# Patient Record
Sex: FEMALE | Race: WHITE | Employment: FULL TIME | URBAN - METROPOLITAN AREA
[De-identification: names, ages, dates, MRNs, and addresses within clinical notes are randomized per-mention and may not be internally consistent; named-entity substitution may affect disease eponyms.]

---

## 2024-04-17 ENCOUNTER — HOSPITAL ENCOUNTER (INPATIENT)
Age: 59
LOS: 8 days | Discharge: HOME | DRG: 419 | End: 2024-04-25
Attending: FAMILY MEDICINE | Admitting: FAMILY MEDICINE
Payer: COMMERCIAL

## 2024-04-17 DIAGNOSIS — R79.89 ELEVATED LFTS: ICD-10-CM

## 2024-04-17 DIAGNOSIS — G89.18 POSTOPERATIVE PAIN: ICD-10-CM

## 2024-04-17 DIAGNOSIS — K80.50 BILIARY COLIC: Primary | ICD-10-CM

## 2024-04-17 DIAGNOSIS — R10.9 ABDOMINAL PAIN, UNSPECIFIED ABDOMINAL LOCATION: ICD-10-CM

## 2024-04-17 DIAGNOSIS — K81.0 ACUTE CHOLECYSTITIS: ICD-10-CM

## 2024-04-17 PROCEDURE — G0378 HOSPITAL OBSERVATION PER HR: HCPCS

## 2024-04-17 PROCEDURE — 96374 THER/PROPH/DIAG INJ IV PUSH: CPT

## 2024-04-17 PROCEDURE — G0379 DIRECT REFER HOSPITAL OBSERV: HCPCS

## 2024-04-17 PROCEDURE — 99222 1ST HOSP IP/OBS MODERATE 55: CPT

## 2024-04-17 PROCEDURE — 1200000000 HC SEMI PRIVATE

## 2024-04-17 PROCEDURE — 96372 THER/PROPH/DIAG INJ SC/IM: CPT

## 2024-04-17 PROCEDURE — 6360000002 HC RX W HCPCS

## 2024-04-17 PROCEDURE — 2580000003 HC RX 258

## 2024-04-17 PROCEDURE — 96361 HYDRATE IV INFUSION ADD-ON: CPT

## 2024-04-17 RX ORDER — POTASSIUM CHLORIDE 20 MEQ/1
40 TABLET, EXTENDED RELEASE ORAL PRN
Status: DISCONTINUED | OUTPATIENT
Start: 2024-04-17 | End: 2024-04-25 | Stop reason: HOSPADM

## 2024-04-17 RX ORDER — SODIUM CHLORIDE 9 MG/ML
INJECTION, SOLUTION INTRAVENOUS PRN
Status: DISCONTINUED | OUTPATIENT
Start: 2024-04-17 | End: 2024-04-25 | Stop reason: HOSPADM

## 2024-04-17 RX ORDER — ACETAMINOPHEN 650 MG/1
650 SUPPOSITORY RECTAL EVERY 6 HOURS PRN
Status: DISCONTINUED | OUTPATIENT
Start: 2024-04-17 | End: 2024-04-18

## 2024-04-17 RX ORDER — MORPHINE SULFATE 2 MG/ML
2 INJECTION, SOLUTION INTRAMUSCULAR; INTRAVENOUS EVERY 4 HOURS PRN
Status: DISCONTINUED | OUTPATIENT
Start: 2024-04-17 | End: 2024-04-17 | Stop reason: SDUPTHER

## 2024-04-17 RX ORDER — ONDANSETRON 4 MG/1
4 TABLET, ORALLY DISINTEGRATING ORAL EVERY 8 HOURS PRN
Status: DISCONTINUED | OUTPATIENT
Start: 2024-04-17 | End: 2024-04-25 | Stop reason: HOSPADM

## 2024-04-17 RX ORDER — MAGNESIUM SULFATE 1 G/100ML
1000 INJECTION INTRAVENOUS PRN
Status: DISCONTINUED | OUTPATIENT
Start: 2024-04-17 | End: 2024-04-25 | Stop reason: HOSPADM

## 2024-04-17 RX ORDER — SODIUM CHLORIDE 0.9 % (FLUSH) 0.9 %
10 SYRINGE (ML) INJECTION PRN
Status: DISCONTINUED | OUTPATIENT
Start: 2024-04-17 | End: 2024-04-25 | Stop reason: HOSPADM

## 2024-04-17 RX ORDER — POLYETHYLENE GLYCOL 3350 17 G/17G
17 POWDER, FOR SOLUTION ORAL DAILY PRN
Status: DISCONTINUED | OUTPATIENT
Start: 2024-04-17 | End: 2024-04-25 | Stop reason: HOSPADM

## 2024-04-17 RX ORDER — ACETAMINOPHEN 325 MG/1
650 TABLET ORAL EVERY 6 HOURS PRN
Status: DISCONTINUED | OUTPATIENT
Start: 2024-04-17 | End: 2024-04-18

## 2024-04-17 RX ORDER — MORPHINE SULFATE 2 MG/ML
2 INJECTION, SOLUTION INTRAMUSCULAR; INTRAVENOUS EVERY 4 HOURS PRN
Status: DISCONTINUED | OUTPATIENT
Start: 2024-04-17 | End: 2024-04-18

## 2024-04-17 RX ORDER — SODIUM CHLORIDE 0.9 % (FLUSH) 0.9 %
5-40 SYRINGE (ML) INJECTION EVERY 12 HOURS SCHEDULED
Status: DISCONTINUED | OUTPATIENT
Start: 2024-04-17 | End: 2024-04-22

## 2024-04-17 RX ORDER — ENOXAPARIN SODIUM 100 MG/ML
40 INJECTION SUBCUTANEOUS DAILY
Status: DISCONTINUED | OUTPATIENT
Start: 2024-04-17 | End: 2024-04-25 | Stop reason: HOSPADM

## 2024-04-17 RX ORDER — SODIUM CHLORIDE, SODIUM LACTATE, POTASSIUM CHLORIDE, CALCIUM CHLORIDE 600; 310; 30; 20 MG/100ML; MG/100ML; MG/100ML; MG/100ML
INJECTION, SOLUTION INTRAVENOUS CONTINUOUS
Status: DISCONTINUED | OUTPATIENT
Start: 2024-04-17 | End: 2024-04-18

## 2024-04-17 RX ORDER — ONDANSETRON 2 MG/ML
4 INJECTION INTRAMUSCULAR; INTRAVENOUS EVERY 6 HOURS PRN
Status: DISCONTINUED | OUTPATIENT
Start: 2024-04-17 | End: 2024-04-25 | Stop reason: HOSPADM

## 2024-04-17 RX ORDER — POTASSIUM CHLORIDE 7.45 MG/ML
10 INJECTION INTRAVENOUS PRN
Status: DISCONTINUED | OUTPATIENT
Start: 2024-04-17 | End: 2024-04-25 | Stop reason: HOSPADM

## 2024-04-17 RX ADMIN — SODIUM CHLORIDE, POTASSIUM CHLORIDE, SODIUM LACTATE AND CALCIUM CHLORIDE: 600; 310; 30; 20 INJECTION, SOLUTION INTRAVENOUS at 18:42

## 2024-04-17 RX ADMIN — ENOXAPARIN SODIUM 40 MG: 100 INJECTION SUBCUTANEOUS at 21:33

## 2024-04-17 RX ADMIN — PIPERACILLIN AND TAZOBACTAM 3375 MG: 3; .375 INJECTION, POWDER, LYOPHILIZED, FOR SOLUTION INTRAVENOUS at 21:33

## 2024-04-17 RX ADMIN — MORPHINE SULFATE 2 MG: 2 INJECTION, SOLUTION INTRAMUSCULAR; INTRAVENOUS at 19:15

## 2024-04-17 ASSESSMENT — PAIN DESCRIPTION - DESCRIPTORS
DESCRIPTORS: BURNING
DESCRIPTORS: BURNING

## 2024-04-17 ASSESSMENT — PAIN DESCRIPTION - LOCATION
LOCATION: ABDOMEN
LOCATION: ABDOMEN

## 2024-04-17 ASSESSMENT — PAIN SCALES - GENERAL
PAINLEVEL_OUTOF10: 7
PAINLEVEL_OUTOF10: 2
PAINLEVEL_OUTOF10: 7

## 2024-04-17 ASSESSMENT — PAIN DESCRIPTION - ORIENTATION
ORIENTATION: UPPER
ORIENTATION: UPPER

## 2024-04-17 NOTE — H&P
[facility]  Office: [phone]  Derrick Richards DO, Pedro Guajardo DO, Pasquale Ribera DO, Tuan Gómez DO, Aarti Bang MD, Bree Gonzalez MD, Bhupendra Up MD, Fabi Gaming MD,  Mulugeta Harden MD, Mary Kate Quijano MD, Joana Mckeon MD,  Chelle Pascual DO, Izabela Roldan MD, Jerry Ramires MD, Dontrell Richards DO, Marilee Ramsay MD,  Lex Shah DO, Kavitha Newell MD, Katja Alvarado MD, Citlaly Dye MD, Leo Ye MD,  Norris Paige MD, Yobani Joaquin MD, Tessy Green MD, Janey Radford MD, Efrain Tuttle MD, Divya Suh MD, Sonny Stewart DO, Js Steen DO, Bart Gay MD,  Lonny Rincon MD, Shirley Waterhouse, CNP,  Geetha Carrillo CNP, Sen Blanco, CNP,  Deneen Aaron, DNP, Edwige Cota, CNP, Emilie Muniz, CNP, Tania Aly CNP, Dariela Delcid, CNP, Wen Schuster, CNP, Lety Kay, PA-C, Franchesca Lynn PA-C, Marlen Anderson, CNP, Mary Trujillo, CNP, Mechelle Centeno, CNP, Talia Carrion, CNS, Farhana Mathews, CNP, Alma Hansen, CNP, Tracy Schwab, CNP         [facility]   IN-PATIENT SERVICE   [facility]    HISTORY AND PHYSICAL EXAMINATION            Date:   4/17/2024  Patient name:  Mary Gamboa  Date of admission:  4/17/2024  6:15 PM  MRN:   [id number]  Account:  [id number]  YOB: 1965  PCP:    No primary care provider on file.  Room:   2005/2005-01  Code Status:    Full Code    Chief Complaint:     No chief complaint on file.      History Obtained From:     patient    History of Present Illness:     Mary Gamboa is a 59 y.o. Non- / non  female who presents with No chief complaint on file.   and is admitted to the hospital for the management of Acute cholecystitis.    Patient transferred from [facility] where she initially presented on 4/17/24 with complaints of substernal and epigastric pain x 3 days with associated nausea, chills and intermittent shortness of breath. She denies any

## 2024-04-18 ENCOUNTER — APPOINTMENT (OUTPATIENT)
Dept: MRI IMAGING | Age: 59
DRG: 419 | End: 2024-04-18
Attending: FAMILY MEDICINE
Payer: COMMERCIAL

## 2024-04-18 LAB
ALBUMIN SERPL-MCNC: 3.6 G/DL (ref 3.5–5.2)
ALP SERPL-CCNC: 187 U/L (ref 35–104)
ALT SERPL-CCNC: 230 U/L (ref 5–33)
ANION GAP SERPL CALCULATED.3IONS-SCNC: 13 MMOL/L (ref 9–17)
AST SERPL-CCNC: 202 U/L
BASOPHILS # BLD: 0 K/UL (ref 0–0.2)
BASOPHILS NFR BLD: 0 % (ref 0–2)
BILIRUB DIRECT SERPL-MCNC: 0.1 MG/DL
BILIRUB INDIRECT SERPL-MCNC: 0.3 MG/DL (ref 0–1)
BILIRUB SERPL-MCNC: 0.4 MG/DL (ref 0.3–1.2)
BUN SERPL-MCNC: 18 MG/DL (ref 6–20)
BUN/CREAT SERPL: 36 (ref 9–20)
CALCIUM SERPL-MCNC: 8.5 MG/DL (ref 8.6–10.4)
CHLORIDE SERPL-SCNC: 105 MMOL/L (ref 98–107)
CO2 SERPL-SCNC: 20 MMOL/L (ref 20–31)
CREAT SERPL-MCNC: 0.5 MG/DL (ref 0.5–0.9)
EOSINOPHIL # BLD: 0 K/UL (ref 0–0.44)
EOSINOPHILS RELATIVE PERCENT: 0 % (ref 1–4)
ERYTHROCYTE [DISTWIDTH] IN BLOOD BY AUTOMATED COUNT: 13.6 % (ref 11.8–14.4)
GFR SERPL CREATININE-BSD FRML MDRD: >90 ML/MIN/1.73M2
GLUCOSE BLD-MCNC: 140 MG/DL (ref 65–105)
GLUCOSE BLD-MCNC: 85 MG/DL (ref 65–105)
GLUCOSE BLD-MCNC: 90 MG/DL (ref 65–105)
GLUCOSE SERPL-MCNC: 92 MG/DL (ref 70–99)
HAV IGM SERPL QL IA: NONREACTIVE
HBV CORE IGM SERPL QL IA: NONREACTIVE
HBV SURFACE AG SERPL QL IA: NONREACTIVE
HCT VFR BLD AUTO: 35.4 % (ref 36.3–47.1)
HCV AB SERPL QL IA: NONREACTIVE
HGB BLD-MCNC: 11 G/DL (ref 11.9–15.1)
IMM GRANULOCYTES # BLD AUTO: 0.03 K/UL (ref 0–0.3)
IMM GRANULOCYTES NFR BLD: 1 %
INR PPP: 1
LIPASE SERPL-CCNC: 46 U/L (ref 13–60)
LYMPHOCYTES NFR BLD: 0.69 K/UL (ref 1.1–3.7)
LYMPHOCYTES RELATIVE PERCENT: 21 % (ref 24–43)
MCH RBC QN AUTO: 28.8 PG (ref 25.2–33.5)
MCHC RBC AUTO-ENTMCNC: 31.1 G/DL (ref 28.4–34.8)
MCV RBC AUTO: 92.7 FL (ref 82.6–102.9)
MONOCYTES NFR BLD: 0.26 K/UL (ref 0.1–1.2)
MONOCYTES NFR BLD: 8 % (ref 3–12)
NEUTROPHILS NFR BLD: 70 % (ref 36–65)
NEUTS SEG NFR BLD: 2.32 K/UL (ref 1.5–8.1)
NRBC BLD-RTO: 0 PER 100 WBC
PLATELET # BLD AUTO: 143 K/UL (ref 138–453)
PMV BLD AUTO: 10.5 FL (ref 8.1–13.5)
POTASSIUM SERPL-SCNC: 4.2 MMOL/L (ref 3.7–5.3)
PROT SERPL-MCNC: 6.7 G/DL (ref 6.4–8.3)
PROTHROMBIN TIME: 13.5 SEC (ref 11.5–14.2)
RBC # BLD AUTO: 3.82 M/UL (ref 3.95–5.11)
SODIUM SERPL-SCNC: 138 MMOL/L (ref 135–144)
WBC OTHER # BLD: 3.3 K/UL (ref 3.5–11.3)

## 2024-04-18 PROCEDURE — 96361 HYDRATE IV INFUSION ADD-ON: CPT

## 2024-04-18 PROCEDURE — 74183 MRI ABD W/O CNTR FLWD CNTR: CPT

## 2024-04-18 PROCEDURE — 6360000002 HC RX W HCPCS: Performed by: NURSE PRACTITIONER

## 2024-04-18 PROCEDURE — 96375 TX/PRO/DX INJ NEW DRUG ADDON: CPT

## 2024-04-18 PROCEDURE — 80076 HEPATIC FUNCTION PANEL: CPT

## 2024-04-18 PROCEDURE — 36415 COLL VENOUS BLD VENIPUNCTURE: CPT

## 2024-04-18 PROCEDURE — 86225 DNA ANTIBODY NATIVE: CPT

## 2024-04-18 PROCEDURE — 85025 COMPLETE CBC W/AUTO DIFF WBC: CPT

## 2024-04-18 PROCEDURE — G0378 HOSPITAL OBSERVATION PER HR: HCPCS

## 2024-04-18 PROCEDURE — 2580000003 HC RX 258: Performed by: NURSE PRACTITIONER

## 2024-04-18 PROCEDURE — A9579 GAD-BASE MR CONTRAST NOS,1ML: HCPCS | Performed by: PHYSICIAN ASSISTANT

## 2024-04-18 PROCEDURE — 2580000003 HC RX 258

## 2024-04-18 PROCEDURE — 99231 SBSQ HOSP IP/OBS SF/LOW 25: CPT | Performed by: NURSE PRACTITIONER

## 2024-04-18 PROCEDURE — 86038 ANTINUCLEAR ANTIBODIES: CPT

## 2024-04-18 PROCEDURE — 83690 ASSAY OF LIPASE: CPT

## 2024-04-18 PROCEDURE — 82947 ASSAY GLUCOSE BLOOD QUANT: CPT

## 2024-04-18 PROCEDURE — 80074 ACUTE HEPATITIS PANEL: CPT

## 2024-04-18 PROCEDURE — 96376 TX/PRO/DX INJ SAME DRUG ADON: CPT

## 2024-04-18 PROCEDURE — 6360000004 HC RX CONTRAST MEDICATION: Performed by: PHYSICIAN ASSISTANT

## 2024-04-18 PROCEDURE — 6370000000 HC RX 637 (ALT 250 FOR IP)

## 2024-04-18 PROCEDURE — 1200000000 HC SEMI PRIVATE

## 2024-04-18 PROCEDURE — 85610 PROTHROMBIN TIME: CPT

## 2024-04-18 PROCEDURE — 80048 BASIC METABOLIC PNL TOTAL CA: CPT

## 2024-04-18 PROCEDURE — 6360000002 HC RX W HCPCS

## 2024-04-18 RX ORDER — KETOROLAC TROMETHAMINE 30 MG/ML
30 INJECTION, SOLUTION INTRAMUSCULAR; INTRAVENOUS ONCE
Status: COMPLETED | OUTPATIENT
Start: 2024-04-18 | End: 2024-04-18

## 2024-04-18 RX ORDER — DEXTROSE MONOHYDRATE 100 MG/ML
INJECTION, SOLUTION INTRAVENOUS CONTINUOUS PRN
Status: DISCONTINUED | OUTPATIENT
Start: 2024-04-18 | End: 2024-04-25 | Stop reason: HOSPADM

## 2024-04-18 RX ORDER — INSULIN LISPRO 100 [IU]/ML
0-4 INJECTION, SOLUTION INTRAVENOUS; SUBCUTANEOUS EVERY 4 HOURS
Status: DISCONTINUED | OUTPATIENT
Start: 2024-04-18 | End: 2024-04-20

## 2024-04-18 RX ORDER — FENTANYL CITRATE 0.05 MG/ML
25 INJECTION, SOLUTION INTRAMUSCULAR; INTRAVENOUS
Status: DISCONTINUED | OUTPATIENT
Start: 2024-04-18 | End: 2024-04-20

## 2024-04-18 RX ORDER — DEXTROSE AND SODIUM CHLORIDE 5; .45 G/100ML; G/100ML
INJECTION, SOLUTION INTRAVENOUS CONTINUOUS
Status: DISCONTINUED | OUTPATIENT
Start: 2024-04-18 | End: 2024-04-20

## 2024-04-18 RX ADMIN — PIPERACILLIN AND TAZOBACTAM 3375 MG: 3; .375 INJECTION, POWDER, LYOPHILIZED, FOR SOLUTION INTRAVENOUS at 20:20

## 2024-04-18 RX ADMIN — KETOROLAC TROMETHAMINE 30 MG: 30 INJECTION, SOLUTION INTRAMUSCULAR at 09:12

## 2024-04-18 RX ADMIN — KETOROLAC TROMETHAMINE 30 MG: 30 INJECTION, SOLUTION INTRAMUSCULAR at 15:57

## 2024-04-18 RX ADMIN — PIPERACILLIN AND TAZOBACTAM 3375 MG: 3; .375 INJECTION, POWDER, LYOPHILIZED, FOR SOLUTION INTRAVENOUS at 13:00

## 2024-04-18 RX ADMIN — ONDANSETRON 4 MG: 2 INJECTION INTRAMUSCULAR; INTRAVENOUS at 18:41

## 2024-04-18 RX ADMIN — GADOTERIDOL 15 ML: 279.3 INJECTION, SOLUTION INTRAVENOUS at 10:17

## 2024-04-18 RX ADMIN — ONDANSETRON 4 MG: 2 INJECTION INTRAMUSCULAR; INTRAVENOUS at 04:48

## 2024-04-18 RX ADMIN — PIPERACILLIN AND TAZOBACTAM 3375 MG: 3; .375 INJECTION, POWDER, LYOPHILIZED, FOR SOLUTION INTRAVENOUS at 04:53

## 2024-04-18 RX ADMIN — ACETAMINOPHEN 650 MG: 325 TABLET ORAL at 08:03

## 2024-04-18 RX ADMIN — MORPHINE SULFATE 2 MG: 2 INJECTION, SOLUTION INTRAMUSCULAR; INTRAVENOUS at 04:48

## 2024-04-18 RX ADMIN — DEXTROSE AND SODIUM CHLORIDE: 5; 450 INJECTION, SOLUTION INTRAVENOUS at 09:15

## 2024-04-18 ASSESSMENT — PAIN SCALES - GENERAL
PAINLEVEL_OUTOF10: 5
PAINLEVEL_OUTOF10: 3
PAINLEVEL_OUTOF10: 6
PAINLEVEL_OUTOF10: 9
PAINLEVEL_OUTOF10: 8
PAINLEVEL_OUTOF10: 0
PAINLEVEL_OUTOF10: 3

## 2024-04-18 ASSESSMENT — PAIN DESCRIPTION - LOCATION
LOCATION: HEAD

## 2024-04-18 ASSESSMENT — PAIN DESCRIPTION - DESCRIPTORS
DESCRIPTORS: SHARP
DESCRIPTORS: POUNDING
DESCRIPTORS: POUNDING

## 2024-04-18 NOTE — CONSULTS
INTERNAL MEDICINE HISTORY AND PHYSICAL EXAMINATION      Patient:  Mary Gamboa  MRN: [id number]    [facility] Physician: No primary care provider on file.    Chief Complaint :      Epigastric pain  HPI :      The patient is a 59 y.o. female presents with epigastric pain.  She did have elevated liver function test.  There was some question as to whether she may have passed a common duct stone.  She still has some epigastric discomfort.  Her workup does not show any common duct or gallstones.  She does use nonsteroidals frequently.  As her symptoms seem biliary with pain after eating larger meals I do feel that getting a HIDA scan is the neck step.  If the HIDA scan does not show visualization of the gallbladder or if she is the CCK reproduces her symptoms would consider proceeding with a cholecystectomy.  This may have to be done as an outpatient if she can tolerate liquids as it is going to be Friday by the time she gets her HIDA scan and would not do a semielective procedure over the weekend.  If the HIDA scan is negative would then consider doing a EGD by GI to make sure that she does not have any peptic ulcer disease as the etiology of her epigastric discomfort.  Her liver enzymes could just be elevated from fatty liver.  She really avoids eating fats or sweets and had been heavy as a child and has been able to avoid being heavy her entire adult life and has very good willpower per her .  I am not sure that her symptoms are related to her biliary tract and therefore the plan for a HIDA and possible EGD prior to proceeding with a cholecystectomy if indicated.    Past Medical History :      History reviewed. No pertinent past medical history.    Past Surgical History :          Procedure Laterality Date     SECTION      RIGHT OOPHORECTOMY         Allergies :      No Known Allergies      Home Meds :      Prior to Admission medications    Not on File       Social History :  
hepatitis, and the like.  Based on symptoms and underlying clinical presentation, this is most likely biliary pathology.      RECOMMENDATIONS:   Await MRCP findings.  Keep n.p.o. as the patient is currently nauseated with poor appetite.  Again, her abdominal pain is resolved.  Suspect ERCP tomorrow if MRCP findings show stone and/or other biliary obstruction.  Check viral hepatitis, CHRISTIANO.  Discussed with nurse.    Electronically signed by Pedro Pablo Lopez MD, on 4/18/2024 at 10:36 AM

## 2024-04-18 NOTE — PLAN OF CARE
Patient satisfied with current pain regimen.   Anticipate pt to be d/c home when appropriate      Problem: Discharge Planning  Goal: Discharge to home or other facility with appropriate resources  Outcome: Progressing  Flowsheets (Taken 4/17/2024 1826 by Inna New RN)  Discharge to home or other facility with appropriate resources:   Identify barriers to discharge with patient and caregiver   Identify discharge learning needs (meds, wound care, etc)   Refer to discharge planning if patient needs post-hospital services based on physician order or complex needs related to functional status, cognitive ability or social support system     Problem: Pain  Goal: Verbalizes/displays adequate comfort level or baseline comfort level  Outcome: Progressing

## 2024-04-18 NOTE — PLAN OF CARE
GI was in, said she needs her GB out.  Surg was in and said not so sure just yet.    HIDA ordered, will be done tomorrow AM.  This will decide if she needs it out or not.    Problem: Discharge Planning  Goal: Discharge to home or other facility with appropriate resources  Outcome: Progressing

## 2024-04-18 NOTE — CARE COORDINATION
Case Management Assessment  Initial Evaluation    Date/Time of Evaluation: 4/18/2024 9:12 AM  Assessment Completed by: KATHLEEN MIRANDA RN    If patient is discharged prior to next notation, then this note serves as note for discharge by case management.    Patient Name: Mary Gamboa                   YOB: 1965  Diagnosis: Acute cholecystitis [K81.0]                   Date / Time: 4/17/2024  6:15 PM    Patient Admission Status: Inpatient   Readmission Risk (Low < 19, Mod (19-27), High > 27): Readmission Risk Score: 6    Current PCP: No primary care provider on file.  PCP verified by CM? Yes    Chart Reviewed: Yes      History Provided by: Patient  Patient Orientation: Alert and Oriented    Patient Cognition: Alert    Hospitalization in the last 30 days (Readmission):  No    If yes, Readmission Assessment in CM Navigator will be completed.    Advance Directives:      Code Status: Full Code   Patient's Primary Decision Maker is: Legal Next of Kin      Discharge Planning:    Patient lives with: Spouse/Significant Other Type of Home: House  Primary Care Giver: Self  Patient Support Systems include: Spouse/Significant Other   Current Financial resources: Other (Comment) (BCBS)  Current community resources: None  Current services prior to admission: None            Current DME:              Type of Home Care services:  None    ADLS  Prior functional level: Independent in ADLs/IADLs  Current functional level: Independent in ADLs/IADLs    PT AM-PAC:   /24  OT AM-PAC:   /24    Family can provide assistance at DC: Yes  Would you like Case Management to discuss the discharge plan with any other family members/significant others, and if so, who? No  Plans to Return to Present Housing: Yes  Other Identified Issues/Barriers to RETURNING to current housing: none   Potential Assistance needed at discharge: N/A            Potential DME:    Patient expects to discharge to: House  Plan for transportation at

## 2024-04-19 ENCOUNTER — APPOINTMENT (OUTPATIENT)
Dept: NUCLEAR MEDICINE | Age: 59
DRG: 419 | End: 2024-04-19
Attending: FAMILY MEDICINE
Payer: COMMERCIAL

## 2024-04-19 LAB
ALBUMIN SERPL-MCNC: 3.5 G/DL (ref 3.5–5.2)
ALP SERPL-CCNC: 180 U/L (ref 35–104)
ALT SERPL-CCNC: 259 U/L (ref 5–33)
ANION GAP SERPL CALCULATED.3IONS-SCNC: 9 MMOL/L (ref 9–17)
AST SERPL-CCNC: 210 U/L
BASOPHILS # BLD: <0.03 K/UL (ref 0–0.2)
BASOPHILS NFR BLD: 0 % (ref 0–2)
BILIRUB DIRECT SERPL-MCNC: 0.1 MG/DL
BILIRUB INDIRECT SERPL-MCNC: 0.2 MG/DL (ref 0–1)
BILIRUB SERPL-MCNC: 0.3 MG/DL (ref 0.3–1.2)
BUN SERPL-MCNC: 22 MG/DL (ref 6–20)
BUN/CREAT SERPL: 44 (ref 9–20)
CALCIUM SERPL-MCNC: 8.2 MG/DL (ref 8.6–10.4)
CHLORIDE SERPL-SCNC: 105 MMOL/L (ref 98–107)
CO2 SERPL-SCNC: 23 MMOL/L (ref 20–31)
CREAT SERPL-MCNC: 0.5 MG/DL (ref 0.5–0.9)
EOSINOPHIL # BLD: <0.03 K/UL (ref 0–0.44)
EOSINOPHILS RELATIVE PERCENT: 0 % (ref 1–4)
ERYTHROCYTE [DISTWIDTH] IN BLOOD BY AUTOMATED COUNT: 13.5 % (ref 11.8–14.4)
GFR SERPL CREATININE-BSD FRML MDRD: >90 ML/MIN/1.73M2
GLUCOSE BLD-MCNC: 117 MG/DL (ref 65–105)
GLUCOSE BLD-MCNC: 125 MG/DL (ref 65–105)
GLUCOSE BLD-MCNC: 126 MG/DL (ref 65–105)
GLUCOSE BLD-MCNC: 143 MG/DL (ref 65–105)
GLUCOSE SERPL-MCNC: 119 MG/DL (ref 70–99)
HCT VFR BLD AUTO: 33.4 % (ref 36.3–47.1)
HGB BLD-MCNC: 10.6 G/DL (ref 11.9–15.1)
IMM GRANULOCYTES # BLD AUTO: 0.02 K/UL (ref 0–0.3)
IMM GRANULOCYTES NFR BLD: 1 %
LIPASE SERPL-CCNC: 72 U/L (ref 13–60)
LYMPHOCYTES NFR BLD: 0.81 K/UL (ref 1.1–3.7)
LYMPHOCYTES RELATIVE PERCENT: 30 % (ref 24–43)
MCH RBC QN AUTO: 28.5 PG (ref 25.2–33.5)
MCHC RBC AUTO-ENTMCNC: 31.7 G/DL (ref 28.4–34.8)
MCV RBC AUTO: 89.8 FL (ref 82.6–102.9)
MONOCYTES NFR BLD: 0.31 K/UL (ref 0.1–1.2)
MONOCYTES NFR BLD: 12 % (ref 3–12)
NEUTROPHILS NFR BLD: 57 % (ref 36–65)
NEUTS SEG NFR BLD: 1.53 K/UL (ref 1.5–8.1)
NRBC BLD-RTO: 0 PER 100 WBC
PLATELET # BLD AUTO: 146 K/UL (ref 138–453)
PMV BLD AUTO: 10.2 FL (ref 8.1–13.5)
POTASSIUM SERPL-SCNC: 4.3 MMOL/L (ref 3.7–5.3)
PROT SERPL-MCNC: 6.3 G/DL (ref 6.4–8.3)
RBC # BLD AUTO: 3.72 M/UL (ref 3.95–5.11)
SODIUM SERPL-SCNC: 137 MMOL/L (ref 135–144)
WBC OTHER # BLD: 2.7 K/UL (ref 3.5–11.3)

## 2024-04-19 PROCEDURE — 6360000002 HC RX W HCPCS: Performed by: SURGERY

## 2024-04-19 PROCEDURE — 1200000000 HC SEMI PRIVATE

## 2024-04-19 PROCEDURE — 6360000002 HC RX W HCPCS: Performed by: NURSE PRACTITIONER

## 2024-04-19 PROCEDURE — 6370000000 HC RX 637 (ALT 250 FOR IP)

## 2024-04-19 PROCEDURE — A9537 TC99M MEBROFENIN: HCPCS | Performed by: SURGERY

## 2024-04-19 PROCEDURE — G0378 HOSPITAL OBSERVATION PER HR: HCPCS

## 2024-04-19 PROCEDURE — 2500000003 HC RX 250 WO HCPCS: Performed by: SURGERY

## 2024-04-19 PROCEDURE — 99231 SBSQ HOSP IP/OBS SF/LOW 25: CPT | Performed by: NURSE PRACTITIONER

## 2024-04-19 PROCEDURE — 80076 HEPATIC FUNCTION PANEL: CPT

## 2024-04-19 PROCEDURE — 96375 TX/PRO/DX INJ NEW DRUG ADDON: CPT

## 2024-04-19 PROCEDURE — 2580000003 HC RX 258: Performed by: NURSE PRACTITIONER

## 2024-04-19 PROCEDURE — 36415 COLL VENOUS BLD VENIPUNCTURE: CPT

## 2024-04-19 PROCEDURE — 85025 COMPLETE CBC W/AUTO DIFF WBC: CPT

## 2024-04-19 PROCEDURE — 78227 HEPATOBIL SYST IMAGE W/DRUG: CPT

## 2024-04-19 PROCEDURE — 3430000000 HC RX DIAGNOSTIC RADIOPHARMACEUTICAL: Performed by: SURGERY

## 2024-04-19 PROCEDURE — 82947 ASSAY GLUCOSE BLOOD QUANT: CPT

## 2024-04-19 PROCEDURE — 96361 HYDRATE IV INFUSION ADD-ON: CPT

## 2024-04-19 PROCEDURE — 6370000000 HC RX 637 (ALT 250 FOR IP): Performed by: NURSE PRACTITIONER

## 2024-04-19 PROCEDURE — 80048 BASIC METABOLIC PNL TOTAL CA: CPT

## 2024-04-19 PROCEDURE — 2580000003 HC RX 258

## 2024-04-19 PROCEDURE — 96376 TX/PRO/DX INJ SAME DRUG ADON: CPT

## 2024-04-19 PROCEDURE — 6360000002 HC RX W HCPCS

## 2024-04-19 PROCEDURE — 83690 ASSAY OF LIPASE: CPT

## 2024-04-19 RX ORDER — HYDROMORPHONE HYDROCHLORIDE 1 MG/ML
2 INJECTION, SOLUTION INTRAMUSCULAR; INTRAVENOUS; SUBCUTANEOUS
Status: DISCONTINUED | OUTPATIENT
Start: 2024-04-19 | End: 2024-04-22

## 2024-04-19 RX ORDER — ONDANSETRON 2 MG/ML
4 INJECTION INTRAMUSCULAR; INTRAVENOUS ONCE
Status: COMPLETED | OUTPATIENT
Start: 2024-04-19 | End: 2024-04-19

## 2024-04-19 RX ORDER — SCOLOPAMINE TRANSDERMAL SYSTEM 1 MG/1
1 PATCH, EXTENDED RELEASE TRANSDERMAL
Status: DISCONTINUED | OUTPATIENT
Start: 2024-04-19 | End: 2024-04-20

## 2024-04-19 RX ORDER — HYDROMORPHONE HYDROCHLORIDE 1 MG/ML
1 INJECTION, SOLUTION INTRAMUSCULAR; INTRAVENOUS; SUBCUTANEOUS
Status: DISCONTINUED | OUTPATIENT
Start: 2024-04-19 | End: 2024-04-22

## 2024-04-19 RX ORDER — BUTALBITAL, ACETAMINOPHEN AND CAFFEINE 50; 325; 40 MG/1; MG/1; MG/1
1 TABLET ORAL EVERY 4 HOURS PRN
Status: DISCONTINUED | OUTPATIENT
Start: 2024-04-19 | End: 2024-04-25 | Stop reason: HOSPADM

## 2024-04-19 RX ADMIN — PIPERACILLIN AND TAZOBACTAM 3375 MG: 3; .375 INJECTION, POWDER, LYOPHILIZED, FOR SOLUTION INTRAVENOUS at 13:01

## 2024-04-19 RX ADMIN — SODIUM CHLORIDE, PRESERVATIVE FREE 10 ML: 5 INJECTION INTRAVENOUS at 09:01

## 2024-04-19 RX ADMIN — Medication 5 MILLICURIE: at 10:15

## 2024-04-19 RX ADMIN — ONDANSETRON 4 MG: 4 TABLET, ORALLY DISINTEGRATING ORAL at 06:17

## 2024-04-19 RX ADMIN — FENTANYL CITRATE 25 MCG: 0.05 INJECTION, SOLUTION INTRAMUSCULAR; INTRAVENOUS at 12:50

## 2024-04-19 RX ADMIN — PIPERACILLIN AND TAZOBACTAM 3375 MG: 3; .375 INJECTION, POWDER, LYOPHILIZED, FOR SOLUTION INTRAVENOUS at 04:32

## 2024-04-19 RX ADMIN — HYDROMORPHONE HYDROCHLORIDE 1 MG: 1 INJECTION, SOLUTION INTRAMUSCULAR; INTRAVENOUS; SUBCUTANEOUS at 18:07

## 2024-04-19 RX ADMIN — ONDANSETRON 4 MG: 2 INJECTION INTRAMUSCULAR; INTRAVENOUS at 14:45

## 2024-04-19 RX ADMIN — ONDANSETRON 4 MG: 2 INJECTION INTRAMUSCULAR; INTRAVENOUS at 09:00

## 2024-04-19 RX ADMIN — ONDANSETRON 4 MG: 2 INJECTION INTRAMUSCULAR; INTRAVENOUS at 23:31

## 2024-04-19 RX ADMIN — DEXTROSE AND SODIUM CHLORIDE: 5; 450 INJECTION, SOLUTION INTRAVENOUS at 06:11

## 2024-04-19 RX ADMIN — DEXTROSE AND SODIUM CHLORIDE: 5; 450 INJECTION, SOLUTION INTRAVENOUS at 15:59

## 2024-04-19 RX ADMIN — HYDROMORPHONE HYDROCHLORIDE 1 MG: 1 INJECTION, SOLUTION INTRAMUSCULAR; INTRAVENOUS; SUBCUTANEOUS at 23:31

## 2024-04-19 RX ADMIN — PIPERACILLIN AND TAZOBACTAM 3375 MG: 3; .375 INJECTION, POWDER, LYOPHILIZED, FOR SOLUTION INTRAVENOUS at 20:45

## 2024-04-19 RX ADMIN — FENTANYL CITRATE 25 MCG: 0.05 INJECTION, SOLUTION INTRAMUSCULAR; INTRAVENOUS at 06:07

## 2024-04-19 ASSESSMENT — PAIN DESCRIPTION - LOCATION
LOCATION: ABDOMEN
LOCATION: ABDOMEN;HEAD
LOCATION: ABDOMEN
LOCATION: ABDOMEN

## 2024-04-19 ASSESSMENT — PAIN DESCRIPTION - PAIN TYPE
TYPE: ACUTE PAIN

## 2024-04-19 ASSESSMENT — PAIN DESCRIPTION - FREQUENCY
FREQUENCY: INTERMITTENT
FREQUENCY: CONTINUOUS
FREQUENCY: INTERMITTENT

## 2024-04-19 ASSESSMENT — PAIN DESCRIPTION - ONSET
ONSET: ON-GOING
ONSET: ON-GOING
ONSET: GRADUAL

## 2024-04-19 ASSESSMENT — PAIN DESCRIPTION - ORIENTATION: ORIENTATION: LOWER

## 2024-04-19 ASSESSMENT — PAIN DESCRIPTION - DESCRIPTORS
DESCRIPTORS: ACHING;POUNDING
DESCRIPTORS: ACHING
DESCRIPTORS: ACHING;DULL;DISCOMFORT
DESCRIPTORS: ACHING

## 2024-04-19 ASSESSMENT — PAIN - FUNCTIONAL ASSESSMENT
PAIN_FUNCTIONAL_ASSESSMENT: ACTIVITIES ARE NOT PREVENTED
PAIN_FUNCTIONAL_ASSESSMENT: ACTIVITIES ARE NOT PREVENTED

## 2024-04-19 ASSESSMENT — PAIN SCALES - GENERAL
PAINLEVEL_OUTOF10: 6
PAINLEVEL_OUTOF10: 9
PAINLEVEL_OUTOF10: 5
PAINLEVEL_OUTOF10: 10
PAINLEVEL_OUTOF10: 4
PAINLEVEL_OUTOF10: 10
PAINLEVEL_OUTOF10: 2

## 2024-04-19 NOTE — PLAN OF CARE
Problem: Discharge Planning  Goal: Discharge to home or other facility with appropriate resources  4/18/2024 2106 by Malissa Sebastian, RN  Outcome: Progressing     Problem: Pain  Goal: Verbalizes/displays adequate comfort level or baseline comfort level  Outcome: Progressing

## 2024-04-19 NOTE — CARE COORDINATION
[facility] Quality Flow/Interdisciplinary Rounds Progress Note    Quality Flow Rounds held on April 19, 2024 at 0930    Disciplines Attending:  Bedside Nurse, , , and Nursing Unit Leadership    Barriers to Discharge: clinical status    Anticipated Discharge Date:   4/21/24    Anticipated Discharge Disposition: Home    Readmission Risk              Risk of Unplanned Readmission:  11           Discussed patient goal for the day, patient clinical progression, and barriers to discharge. Plan for HIDA scan today. GI and General Surgery following.      Caty Ford RN  April 19, 2024

## 2024-04-19 NOTE — PLAN OF CARE
Problem: Discharge Planning  Goal: Discharge to home or other facility with appropriate resources  4/19/2024 1031 by Dontrell Diaz, RN  Outcome: Progressing  Flowsheets (Taken 4/19/2024 1031)  Discharge to home or other facility with appropriate resources: Identify barriers to discharge with patient and caregiver     Problem: Pain  Goal: Verbalizes/displays adequate comfort level or baseline comfort level  4/19/2024 1031 by Dontrell Diaz, RN  Outcome: Progressing  Flowsheets (Taken 4/19/2024 1031)  Verbalizes/displays adequate comfort level or baseline comfort level: Encourage patient to monitor pain and request assistance

## 2024-04-20 PROBLEM — E66.3 OVERWEIGHT: Status: ACTIVE | Noted: 2024-04-20

## 2024-04-20 PROBLEM — R10.9 ABDOMINAL PAIN: Status: ACTIVE | Noted: 2024-04-17

## 2024-04-20 PROBLEM — R74.8 ELEVATED LIVER ENZYMES: Status: ACTIVE | Noted: 2024-04-20

## 2024-04-20 PROBLEM — R11.2 NAUSEA AND VOMITING: Status: ACTIVE | Noted: 2024-04-20

## 2024-04-20 LAB
ALBUMIN SERPL-MCNC: 3.4 G/DL (ref 3.5–5.2)
ALP SERPL-CCNC: 161 U/L (ref 35–104)
ALT SERPL-CCNC: 244 U/L (ref 5–33)
ANA SER QL IA: NEGATIVE
ANION GAP SERPL CALCULATED.3IONS-SCNC: 6 MMOL/L (ref 9–17)
AST SERPL-CCNC: 177 U/L
BASOPHILS # BLD: <0.03 K/UL (ref 0–0.2)
BASOPHILS NFR BLD: 1 % (ref 0–2)
BILIRUB DIRECT SERPL-MCNC: <0.1 MG/DL
BILIRUB INDIRECT SERPL-MCNC: ABNORMAL MG/DL (ref 0–1)
BILIRUB SERPL-MCNC: 0.2 MG/DL (ref 0.3–1.2)
BUN SERPL-MCNC: 10 MG/DL (ref 6–20)
BUN/CREAT SERPL: 25 (ref 9–20)
CALCIUM SERPL-MCNC: 8.3 MG/DL (ref 8.6–10.4)
CHLORIDE SERPL-SCNC: 107 MMOL/L (ref 98–107)
CO2 SERPL-SCNC: 25 MMOL/L (ref 20–31)
CREAT SERPL-MCNC: 0.4 MG/DL (ref 0.5–0.9)
DSDNA IGG SER QL IA: 0.8 IU/ML
EOSINOPHIL # BLD: 0.04 K/UL (ref 0–0.44)
EOSINOPHILS RELATIVE PERCENT: 1 % (ref 1–4)
ERYTHROCYTE [DISTWIDTH] IN BLOOD BY AUTOMATED COUNT: 13.4 % (ref 11.8–14.4)
GFR SERPL CREATININE-BSD FRML MDRD: >90 ML/MIN/1.73M2
GLUCOSE BLD-MCNC: 102 MG/DL (ref 65–105)
GLUCOSE BLD-MCNC: 114 MG/DL (ref 65–105)
GLUCOSE BLD-MCNC: 142 MG/DL (ref 65–105)
GLUCOSE BLD-MCNC: 148 MG/DL (ref 65–105)
GLUCOSE BLD-MCNC: 98 MG/DL (ref 65–105)
GLUCOSE SERPL-MCNC: 139 MG/DL (ref 70–99)
HCT VFR BLD AUTO: 32.6 % (ref 36.3–47.1)
HGB BLD-MCNC: 10.3 G/DL (ref 11.9–15.1)
IMM GRANULOCYTES # BLD AUTO: 0.03 K/UL (ref 0–0.3)
IMM GRANULOCYTES NFR BLD: 1 %
LIPASE SERPL-CCNC: 84 U/L (ref 13–60)
LYMPHOCYTES NFR BLD: 1.21 K/UL (ref 1.1–3.7)
LYMPHOCYTES RELATIVE PERCENT: 34 % (ref 24–43)
MCH RBC QN AUTO: 28.3 PG (ref 25.2–33.5)
MCHC RBC AUTO-ENTMCNC: 31.6 G/DL (ref 28.4–34.8)
MCV RBC AUTO: 89.6 FL (ref 82.6–102.9)
MONOCYTES NFR BLD: 0.49 K/UL (ref 0.1–1.2)
MONOCYTES NFR BLD: 14 % (ref 3–12)
NEUTROPHILS NFR BLD: 49 % (ref 36–65)
NEUTS SEG NFR BLD: 1.78 K/UL (ref 1.5–8.1)
NRBC BLD-RTO: 0 PER 100 WBC
NUCLEAR IGG SER IA-RTO: 0.3 U/ML
PLATELET # BLD AUTO: 156 K/UL (ref 138–453)
PMV BLD AUTO: 10.3 FL (ref 8.1–13.5)
POTASSIUM SERPL-SCNC: 3.7 MMOL/L (ref 3.7–5.3)
PROT SERPL-MCNC: 6 G/DL (ref 6.4–8.3)
RBC # BLD AUTO: 3.64 M/UL (ref 3.95–5.11)
SODIUM SERPL-SCNC: 138 MMOL/L (ref 135–144)
WBC OTHER # BLD: 3.6 K/UL (ref 3.5–11.3)

## 2024-04-20 PROCEDURE — 82947 ASSAY GLUCOSE BLOOD QUANT: CPT

## 2024-04-20 PROCEDURE — 80048 BASIC METABOLIC PNL TOTAL CA: CPT

## 2024-04-20 PROCEDURE — 1200000000 HC SEMI PRIVATE

## 2024-04-20 PROCEDURE — 96376 TX/PRO/DX INJ SAME DRUG ADON: CPT

## 2024-04-20 PROCEDURE — 2580000003 HC RX 258

## 2024-04-20 PROCEDURE — G0378 HOSPITAL OBSERVATION PER HR: HCPCS

## 2024-04-20 PROCEDURE — 96375 TX/PRO/DX INJ NEW DRUG ADDON: CPT

## 2024-04-20 PROCEDURE — 85025 COMPLETE CBC W/AUTO DIFF WBC: CPT

## 2024-04-20 PROCEDURE — 6360000002 HC RX W HCPCS: Performed by: INTERNAL MEDICINE

## 2024-04-20 PROCEDURE — 36415 COLL VENOUS BLD VENIPUNCTURE: CPT

## 2024-04-20 PROCEDURE — 6360000002 HC RX W HCPCS

## 2024-04-20 PROCEDURE — 6370000000 HC RX 637 (ALT 250 FOR IP): Performed by: INTERNAL MEDICINE

## 2024-04-20 PROCEDURE — 6360000002 HC RX W HCPCS: Performed by: SURGERY

## 2024-04-20 PROCEDURE — 2580000003 HC RX 258: Performed by: NURSE PRACTITIONER

## 2024-04-20 PROCEDURE — 2580000003 HC RX 258: Performed by: INTERNAL MEDICINE

## 2024-04-20 PROCEDURE — 80076 HEPATIC FUNCTION PANEL: CPT

## 2024-04-20 PROCEDURE — 83690 ASSAY OF LIPASE: CPT

## 2024-04-20 PROCEDURE — 99232 SBSQ HOSP IP/OBS MODERATE 35: CPT | Performed by: INTERNAL MEDICINE

## 2024-04-20 PROCEDURE — C9113 INJ PANTOPRAZOLE SODIUM, VIA: HCPCS | Performed by: INTERNAL MEDICINE

## 2024-04-20 RX ORDER — FAMOTIDINE 20 MG/1
20 TABLET, FILM COATED ORAL ONCE
Status: COMPLETED | OUTPATIENT
Start: 2024-04-20 | End: 2024-04-20

## 2024-04-20 RX ORDER — SODIUM CHLORIDE 9 MG/ML
INJECTION, SOLUTION INTRAVENOUS CONTINUOUS
Status: ACTIVE | OUTPATIENT
Start: 2024-04-20 | End: 2024-04-21

## 2024-04-20 RX ORDER — INSULIN LISPRO 100 [IU]/ML
0-4 INJECTION, SOLUTION INTRAVENOUS; SUBCUTANEOUS
Status: DISCONTINUED | OUTPATIENT
Start: 2024-04-21 | End: 2024-04-22

## 2024-04-20 RX ADMIN — FAMOTIDINE 20 MG: 20 TABLET, FILM COATED ORAL at 15:17

## 2024-04-20 RX ADMIN — SODIUM CHLORIDE: 9 INJECTION, SOLUTION INTRAVENOUS at 15:09

## 2024-04-20 RX ADMIN — ONDANSETRON 4 MG: 2 INJECTION INTRAMUSCULAR; INTRAVENOUS at 21:32

## 2024-04-20 RX ADMIN — ONDANSETRON 4 MG: 2 INJECTION INTRAMUSCULAR; INTRAVENOUS at 14:18

## 2024-04-20 RX ADMIN — HYDROMORPHONE HYDROCHLORIDE 1 MG: 1 INJECTION, SOLUTION INTRAMUSCULAR; INTRAVENOUS; SUBCUTANEOUS at 05:50

## 2024-04-20 RX ADMIN — BUTALBITAL, ACETAMINOPHEN, AND CAFFEINE 1 TABLET: 50; 325; 40 TABLET ORAL at 08:31

## 2024-04-20 RX ADMIN — PIPERACILLIN AND TAZOBACTAM 3375 MG: 3; .375 INJECTION, POWDER, LYOPHILIZED, FOR SOLUTION INTRAVENOUS at 12:31

## 2024-04-20 RX ADMIN — PIPERACILLIN AND TAZOBACTAM 3375 MG: 3; .375 INJECTION, POWDER, LYOPHILIZED, FOR SOLUTION INTRAVENOUS at 03:46

## 2024-04-20 RX ADMIN — DEXTROSE AND SODIUM CHLORIDE: 5; 450 INJECTION, SOLUTION INTRAVENOUS at 02:04

## 2024-04-20 RX ADMIN — HYDROMORPHONE HYDROCHLORIDE 1 MG: 1 INJECTION, SOLUTION INTRAMUSCULAR; INTRAVENOUS; SUBCUTANEOUS at 15:05

## 2024-04-20 RX ADMIN — HYDROMORPHONE HYDROCHLORIDE 1 MG: 1 INJECTION, SOLUTION INTRAMUSCULAR; INTRAVENOUS; SUBCUTANEOUS at 22:22

## 2024-04-20 RX ADMIN — SODIUM CHLORIDE, PRESERVATIVE FREE 40 MG: 5 INJECTION INTRAVENOUS at 21:31

## 2024-04-20 RX ADMIN — ONDANSETRON 4 MG: 2 INJECTION INTRAMUSCULAR; INTRAVENOUS at 05:47

## 2024-04-20 ASSESSMENT — PAIN SCALES - GENERAL
PAINLEVEL_OUTOF10: 2
PAINLEVEL_OUTOF10: 6
PAINLEVEL_OUTOF10: 1
PAINLEVEL_OUTOF10: 1
PAINLEVEL_OUTOF10: 5
PAINLEVEL_OUTOF10: 4
PAINLEVEL_OUTOF10: 6
PAINLEVEL_OUTOF10: 4
PAINLEVEL_OUTOF10: 2

## 2024-04-20 ASSESSMENT — PAIN DESCRIPTION - LOCATION
LOCATION: ABDOMEN;HEAD
LOCATION: HEAD
LOCATION: ABDOMEN

## 2024-04-20 ASSESSMENT — PAIN DESCRIPTION - DESCRIPTORS
DESCRIPTORS: DISCOMFORT;TENDER
DESCRIPTORS: ACHING;POUNDING
DESCRIPTORS: PRESSURE
DESCRIPTORS: ACHING

## 2024-04-20 ASSESSMENT — PAIN DESCRIPTION - ORIENTATION: ORIENTATION: POSTERIOR;OTHER (COMMENT)

## 2024-04-20 ASSESSMENT — PAIN DESCRIPTION - FREQUENCY
FREQUENCY: INTERMITTENT
FREQUENCY: INTERMITTENT

## 2024-04-20 ASSESSMENT — PAIN DESCRIPTION - ONSET
ONSET: GRADUAL
ONSET: GRADUAL

## 2024-04-20 ASSESSMENT — PAIN DESCRIPTION - PAIN TYPE
TYPE: ACUTE PAIN

## 2024-04-20 NOTE — PLAN OF CARE
Problem: Discharge Planning  Goal: Discharge to home or other facility with appropriate resources  4/20/2024 1124 by Juanita Garcia, RN  Outcome: Progressing  Flowsheets (Taken 4/20/2024 0830)  Discharge to home or other facility with appropriate resources:   Identify barriers to discharge with patient and caregiver   Arrange for needed discharge resources and transportation as appropriate   Identify discharge learning needs (meds, wound care, etc)   Refer to discharge planning if patient needs post-hospital services based on physician order or complex needs related to functional status, cognitive ability or social support system  4/19/2024 2144 by Malissa Sebastian, RN  Outcome: Progressing     Problem: Pain  Goal: Verbalizes/displays adequate comfort level or baseline comfort level  4/20/2024 1124 by Juanita Garcia RN  Outcome: Progressing  4/19/2024 2144 by Malissa Sebastian, RN  Outcome: Progressing

## 2024-04-20 NOTE — PLAN OF CARE
Problem: Discharge Planning  Goal: Discharge to home or other facility with appropriate resources  4/19/2024 2144 by Malissa Sebastian, RN  Outcome: Progressing     Problem: Pain  Goal: Verbalizes/displays adequate comfort level or baseline comfort level  4/19/2024 2144 by Malissa Sebastian, RN  Outcome: Progressing

## 2024-04-21 PROBLEM — R10.11 RIGHT UPPER QUADRANT ABDOMINAL PAIN: Status: ACTIVE | Noted: 2024-04-17

## 2024-04-21 PROBLEM — K80.50 BILIARY COLIC: Status: ACTIVE | Noted: 2024-04-21

## 2024-04-21 LAB
ALBUMIN SERPL-MCNC: 3.4 G/DL (ref 3.5–5.2)
ALP SERPL-CCNC: 144 U/L (ref 35–104)
ALT SERPL-CCNC: 495 U/L (ref 5–33)
ANION GAP SERPL CALCULATED.3IONS-SCNC: 7 MMOL/L (ref 9–17)
AST SERPL-CCNC: 458 U/L
BASOPHILS # BLD: <0.03 K/UL (ref 0–0.2)
BASOPHILS NFR BLD: 1 % (ref 0–2)
BILIRUB DIRECT SERPL-MCNC: 0.1 MG/DL
BILIRUB INDIRECT SERPL-MCNC: 0.1 MG/DL (ref 0–1)
BILIRUB SERPL-MCNC: 0.2 MG/DL (ref 0.3–1.2)
BUN SERPL-MCNC: 9 MG/DL (ref 6–20)
BUN/CREAT SERPL: 18 (ref 9–20)
CALCIUM SERPL-MCNC: 8.4 MG/DL (ref 8.6–10.4)
CANCER AG19-9 SERPL IA-ACNC: 28 U/ML (ref 0–35)
CEA SERPL-MCNC: 0.8 NG/ML (ref 0–3.8)
CHLORIDE SERPL-SCNC: 107 MMOL/L (ref 98–107)
CO2 SERPL-SCNC: 25 MMOL/L (ref 20–31)
CREAT SERPL-MCNC: 0.5 MG/DL (ref 0.5–0.9)
EOSINOPHIL # BLD: 0.09 K/UL (ref 0–0.44)
EOSINOPHILS RELATIVE PERCENT: 2 % (ref 1–4)
ERYTHROCYTE [DISTWIDTH] IN BLOOD BY AUTOMATED COUNT: 13.6 % (ref 11.8–14.4)
GFR SERPL CREATININE-BSD FRML MDRD: >90 ML/MIN/1.73M2
GLUCOSE BLD-MCNC: 108 MG/DL (ref 65–105)
GLUCOSE BLD-MCNC: 112 MG/DL (ref 65–105)
GLUCOSE BLD-MCNC: 86 MG/DL (ref 65–105)
GLUCOSE BLD-MCNC: 91 MG/DL (ref 65–105)
GLUCOSE SERPL-MCNC: 108 MG/DL (ref 70–99)
HCT VFR BLD AUTO: 31.2 % (ref 36.3–47.1)
HGB BLD-MCNC: 10 G/DL (ref 11.9–15.1)
IMM GRANULOCYTES # BLD AUTO: 0.04 K/UL (ref 0–0.3)
IMM GRANULOCYTES NFR BLD: 1 %
LIPASE SERPL-CCNC: 114 U/L (ref 13–60)
LYMPHOCYTES NFR BLD: 1.3 K/UL (ref 1.1–3.7)
LYMPHOCYTES RELATIVE PERCENT: 34 % (ref 24–43)
MCH RBC QN AUTO: 29.7 PG (ref 25.2–33.5)
MCHC RBC AUTO-ENTMCNC: 32.1 G/DL (ref 28.4–34.8)
MCV RBC AUTO: 92.6 FL (ref 82.6–102.9)
MONOCYTES NFR BLD: 0.47 K/UL (ref 0.1–1.2)
MONOCYTES NFR BLD: 12 % (ref 3–12)
NEUTROPHILS NFR BLD: 50 % (ref 36–65)
NEUTS SEG NFR BLD: 1.86 K/UL (ref 1.5–8.1)
NRBC BLD-RTO: 0 PER 100 WBC
PLATELET # BLD AUTO: 167 K/UL (ref 138–453)
PMV BLD AUTO: 10.7 FL (ref 8.1–13.5)
POTASSIUM SERPL-SCNC: 3.8 MMOL/L (ref 3.7–5.3)
PROT SERPL-MCNC: 5.9 G/DL (ref 6.4–8.3)
RBC # BLD AUTO: 3.37 M/UL (ref 3.95–5.11)
SODIUM SERPL-SCNC: 139 MMOL/L (ref 135–144)
T4 FREE SERPL-MCNC: 1.2 NG/DL (ref 0.9–1.7)
TSH SERPL DL<=0.05 MIU/L-ACNC: 6.76 UIU/ML (ref 0.3–5)
WBC OTHER # BLD: 3.8 K/UL (ref 3.5–11.3)

## 2024-04-21 PROCEDURE — 83516 IMMUNOASSAY NONANTIBODY: CPT

## 2024-04-21 PROCEDURE — 1200000000 HC SEMI PRIVATE

## 2024-04-21 PROCEDURE — 85025 COMPLETE CBC W/AUTO DIFF WBC: CPT

## 2024-04-21 PROCEDURE — 80076 HEPATIC FUNCTION PANEL: CPT

## 2024-04-21 PROCEDURE — 84443 ASSAY THYROID STIM HORMONE: CPT

## 2024-04-21 PROCEDURE — 6360000002 HC RX W HCPCS: Performed by: SURGERY

## 2024-04-21 PROCEDURE — 80048 BASIC METABOLIC PNL TOTAL CA: CPT

## 2024-04-21 PROCEDURE — 6360000002 HC RX W HCPCS

## 2024-04-21 PROCEDURE — 36415 COLL VENOUS BLD VENIPUNCTURE: CPT

## 2024-04-21 PROCEDURE — 84439 ASSAY OF FREE THYROXINE: CPT

## 2024-04-21 PROCEDURE — 6360000002 HC RX W HCPCS: Performed by: INTERNAL MEDICINE

## 2024-04-21 PROCEDURE — 82947 ASSAY GLUCOSE BLOOD QUANT: CPT

## 2024-04-21 PROCEDURE — 86301 IMMUNOASSAY TUMOR CA 19-9: CPT

## 2024-04-21 PROCEDURE — A4216 STERILE WATER/SALINE, 10 ML: HCPCS | Performed by: INTERNAL MEDICINE

## 2024-04-21 PROCEDURE — 6370000000 HC RX 637 (ALT 250 FOR IP): Performed by: INTERNAL MEDICINE

## 2024-04-21 PROCEDURE — 82378 CARCINOEMBRYONIC ANTIGEN: CPT

## 2024-04-21 PROCEDURE — 96376 TX/PRO/DX INJ SAME DRUG ADON: CPT

## 2024-04-21 PROCEDURE — 2580000003 HC RX 258: Performed by: INTERNAL MEDICINE

## 2024-04-21 PROCEDURE — C9113 INJ PANTOPRAZOLE SODIUM, VIA: HCPCS | Performed by: INTERNAL MEDICINE

## 2024-04-21 PROCEDURE — 83690 ASSAY OF LIPASE: CPT

## 2024-04-21 PROCEDURE — G0378 HOSPITAL OBSERVATION PER HR: HCPCS

## 2024-04-21 PROCEDURE — 99232 SBSQ HOSP IP/OBS MODERATE 35: CPT | Performed by: INTERNAL MEDICINE

## 2024-04-21 RX ORDER — SODIUM CHLORIDE 9 MG/ML
INJECTION, SOLUTION INTRAVENOUS CONTINUOUS
Status: ACTIVE | OUTPATIENT
Start: 2024-04-21 | End: 2024-04-22

## 2024-04-21 RX ADMIN — SODIUM CHLORIDE, PRESERVATIVE FREE 40 MG: 5 INJECTION INTRAVENOUS at 21:53

## 2024-04-21 RX ADMIN — HYDROMORPHONE HYDROCHLORIDE 1 MG: 1 INJECTION, SOLUTION INTRAMUSCULAR; INTRAVENOUS; SUBCUTANEOUS at 23:54

## 2024-04-21 RX ADMIN — HYDROMORPHONE HYDROCHLORIDE 1 MG: 1 INJECTION, SOLUTION INTRAMUSCULAR; INTRAVENOUS; SUBCUTANEOUS at 11:21

## 2024-04-21 RX ADMIN — ONDANSETRON 4 MG: 2 INJECTION INTRAMUSCULAR; INTRAVENOUS at 15:35

## 2024-04-21 RX ADMIN — BUTALBITAL, ACETAMINOPHEN, AND CAFFEINE 1 TABLET: 50; 325; 40 TABLET ORAL at 15:34

## 2024-04-21 RX ADMIN — HYDROMORPHONE HYDROCHLORIDE 1 MG: 1 INJECTION, SOLUTION INTRAMUSCULAR; INTRAVENOUS; SUBCUTANEOUS at 03:32

## 2024-04-21 RX ADMIN — HYDROMORPHONE HYDROCHLORIDE 1 MG: 1 INJECTION, SOLUTION INTRAMUSCULAR; INTRAVENOUS; SUBCUTANEOUS at 17:55

## 2024-04-21 RX ADMIN — BUTALBITAL, ACETAMINOPHEN, AND CAFFEINE 1 TABLET: 50; 325; 40 TABLET ORAL at 08:44

## 2024-04-21 RX ADMIN — SODIUM CHLORIDE, PRESERVATIVE FREE 40 MG: 5 INJECTION INTRAVENOUS at 08:48

## 2024-04-21 RX ADMIN — SODIUM CHLORIDE: 9 INJECTION, SOLUTION INTRAVENOUS at 17:50

## 2024-04-21 RX ADMIN — SODIUM CHLORIDE: 9 INJECTION, SOLUTION INTRAVENOUS at 03:22

## 2024-04-21 ASSESSMENT — PAIN DESCRIPTION - LOCATION
LOCATION: HEAD
LOCATION: ABDOMEN
LOCATION: HEAD
LOCATION: ABDOMEN

## 2024-04-21 ASSESSMENT — PAIN DESCRIPTION - ORIENTATION
ORIENTATION: POSTERIOR
ORIENTATION: UPPER;MID;RIGHT
ORIENTATION: POSTERIOR
ORIENTATION: RIGHT;MID;UPPER
ORIENTATION: MID

## 2024-04-21 ASSESSMENT — PAIN SCALES - GENERAL
PAINLEVEL_OUTOF10: 5
PAINLEVEL_OUTOF10: 7
PAINLEVEL_OUTOF10: 7
PAINLEVEL_OUTOF10: 4
PAINLEVEL_OUTOF10: 2
PAINLEVEL_OUTOF10: 5
PAINLEVEL_OUTOF10: 5
PAINLEVEL_OUTOF10: 4
PAINLEVEL_OUTOF10: 6

## 2024-04-21 ASSESSMENT — PAIN DESCRIPTION - FREQUENCY
FREQUENCY: INTERMITTENT
FREQUENCY: CONTINUOUS

## 2024-04-21 ASSESSMENT — PAIN DESCRIPTION - PAIN TYPE
TYPE: ACUTE PAIN

## 2024-04-21 ASSESSMENT — PAIN - FUNCTIONAL ASSESSMENT
PAIN_FUNCTIONAL_ASSESSMENT: ACTIVITIES ARE NOT PREVENTED

## 2024-04-21 ASSESSMENT — PAIN DESCRIPTION - ONSET
ONSET: ON-GOING
ONSET: ON-GOING
ONSET: GRADUAL
ONSET: ON-GOING
ONSET: GRADUAL

## 2024-04-21 ASSESSMENT — PAIN DESCRIPTION - DESCRIPTORS
DESCRIPTORS: ACHING
DESCRIPTORS: SQUEEZING
DESCRIPTORS: SQUEEZING
DESCRIPTORS: PRESSURE
DESCRIPTORS: SQUEEZING;PRESSURE
DESCRIPTORS: ACHING

## 2024-04-21 NOTE — PLAN OF CARE
Problem: Discharge Planning  Goal: Discharge to home or other facility with appropriate resources  4/21/2024 0458 by Angelito Gilman RN  Outcome: Progressing  4/20/2024 2229 by Ciera Mckinney RN  Outcome: Progressing  Flowsheets (Taken 4/20/2024 2000)  Discharge to home or other facility with appropriate resources:   Identify barriers to discharge with patient and caregiver   Arrange for needed discharge resources and transportation as appropriate   Identify discharge learning needs (meds, wound care, etc)     Problem: Pain  Goal: Verbalizes/displays adequate comfort level or baseline comfort level  4/21/2024 0458 by Angelito Gilman RN  Outcome: Progressing  4/20/2024 2229 by Ciera Mckinney RN  Outcome: Progressing     Problem: Gastrointestinal - Adult  Goal: Minimal or absence of nausea and vomiting  4/21/2024 0458 by Angelito Gilman RN  Outcome: Progressing  4/20/2024 2229 by Ciera Mckinney, RN  Outcome: Progressing

## 2024-04-21 NOTE — PLAN OF CARE
Problem: Discharge Planning  Goal: Discharge to home or other facility with appropriate resources  4/21/2024 0918 by Juanita Garcia RN  Outcome: Progressing  Flowsheets (Taken 4/21/2024 0830)  Discharge to home or other facility with appropriate resources:   Identify barriers to discharge with patient and caregiver   Arrange for needed discharge resources and transportation as appropriate   Identify discharge learning needs (meds, wound care, etc)   Refer to discharge planning if patient needs post-hospital services based on physician order or complex needs related to functional status, cognitive ability or social support system  4/21/2024 0458 by Angelito Gilman RN  Outcome: Progressing  4/20/2024 2229 by Ciera Mckinney RN  Outcome: Progressing  Flowsheets (Taken 4/20/2024 2000)  Discharge to home or other facility with appropriate resources:   Identify barriers to discharge with patient and caregiver   Arrange for needed discharge resources and transportation as appropriate   Identify discharge learning needs (meds, wound care, etc)     Problem: Pain  Goal: Verbalizes/displays adequate comfort level or baseline comfort level  4/21/2024 0918 by Juanita Garcia RN  Outcome: Progressing  4/21/2024 0458 by Angelito Gilman RN  Outcome: Progressing  4/20/2024 2229 by Ciera Mckinney RN  Outcome: Progressing     Problem: Gastrointestinal - Adult  Goal: Minimal or absence of nausea and vomiting  4/21/2024 0918 by Juanita Garcia RN  Outcome: Progressing  4/21/2024 0458 by Angelito Gilman RN  Outcome: Progressing  4/20/2024 2229 by Ciera Mckinney RN  Outcome: Progressing

## 2024-04-22 ENCOUNTER — ANESTHESIA (OUTPATIENT)
Dept: OPERATING ROOM | Age: 59
DRG: 419 | End: 2024-04-22
Payer: COMMERCIAL

## 2024-04-22 ENCOUNTER — ANESTHESIA EVENT (OUTPATIENT)
Dept: OPERATING ROOM | Age: 59
DRG: 419 | End: 2024-04-22
Payer: COMMERCIAL

## 2024-04-22 ENCOUNTER — APPOINTMENT (OUTPATIENT)
Dept: GENERAL RADIOLOGY | Age: 59
DRG: 419 | End: 2024-04-22
Attending: FAMILY MEDICINE
Payer: COMMERCIAL

## 2024-04-22 LAB
ALBUMIN SERPL-MCNC: 3.5 G/DL (ref 3.5–5.2)
ALP SERPL-CCNC: 139 U/L (ref 35–104)
ALT SERPL-CCNC: 450 U/L (ref 5–33)
ANION GAP SERPL CALCULATED.3IONS-SCNC: 7 MMOL/L (ref 9–17)
AST SERPL-CCNC: 312 U/L
BASOPHILS # BLD: <0.03 K/UL (ref 0–0.2)
BASOPHILS NFR BLD: 0 % (ref 0–2)
BILIRUB DIRECT SERPL-MCNC: 0.1 MG/DL
BILIRUB INDIRECT SERPL-MCNC: 0.2 MG/DL (ref 0–1)
BILIRUB SERPL-MCNC: 0.3 MG/DL (ref 0.3–1.2)
BUN SERPL-MCNC: 9 MG/DL (ref 6–20)
BUN/CREAT SERPL: 15 (ref 9–20)
CALCIUM SERPL-MCNC: 8.6 MG/DL (ref 8.6–10.4)
CHLORIDE SERPL-SCNC: 106 MMOL/L (ref 98–107)
CO2 SERPL-SCNC: 27 MMOL/L (ref 20–31)
CREAT SERPL-MCNC: 0.6 MG/DL (ref 0.5–0.9)
EOSINOPHIL # BLD: 0.08 K/UL (ref 0–0.44)
EOSINOPHILS RELATIVE PERCENT: 2 % (ref 1–4)
ERYTHROCYTE [DISTWIDTH] IN BLOOD BY AUTOMATED COUNT: 13.6 % (ref 11.8–14.4)
GFR SERPL CREATININE-BSD FRML MDRD: >90 ML/MIN/1.73M2
GLUCOSE BLD-MCNC: 100 MG/DL (ref 65–105)
GLUCOSE BLD-MCNC: 100 MG/DL (ref 65–105)
GLUCOSE BLD-MCNC: 106 MG/DL (ref 65–105)
GLUCOSE BLD-MCNC: 86 MG/DL (ref 65–105)
GLUCOSE SERPL-MCNC: 93 MG/DL (ref 70–99)
HCT VFR BLD AUTO: 30.7 % (ref 36.3–47.1)
HGB BLD-MCNC: 9.8 G/DL (ref 11.9–15.1)
IMM GRANULOCYTES # BLD AUTO: 0.04 K/UL (ref 0–0.3)
IMM GRANULOCYTES NFR BLD: 1 %
LIPASE SERPL-CCNC: 90 U/L (ref 13–60)
LYMPHOCYTES NFR BLD: 1.45 K/UL (ref 1.1–3.7)
LYMPHOCYTES RELATIVE PERCENT: 37 % (ref 24–43)
MCH RBC QN AUTO: 28.8 PG (ref 25.2–33.5)
MCHC RBC AUTO-ENTMCNC: 31.9 G/DL (ref 28.4–34.8)
MCV RBC AUTO: 90.3 FL (ref 82.6–102.9)
MONOCYTES NFR BLD: 0.53 K/UL (ref 0.1–1.2)
MONOCYTES NFR BLD: 13 % (ref 3–12)
NEUTROPHILS NFR BLD: 47 % (ref 36–65)
NEUTS SEG NFR BLD: 1.85 K/UL (ref 1.5–8.1)
NRBC BLD-RTO: 0 PER 100 WBC
PLATELET # BLD AUTO: 171 K/UL (ref 138–453)
PMV BLD AUTO: 9.5 FL (ref 8.1–13.5)
POTASSIUM SERPL-SCNC: 4.1 MMOL/L (ref 3.7–5.3)
PROT SERPL-MCNC: 6.1 G/DL (ref 6.4–8.3)
RBC # BLD AUTO: 3.4 M/UL (ref 3.95–5.11)
SODIUM SERPL-SCNC: 140 MMOL/L (ref 135–144)
WBC OTHER # BLD: 4 K/UL (ref 3.5–11.3)

## 2024-04-22 PROCEDURE — 3700000001 HC ADD 15 MINUTES (ANESTHESIA): Performed by: SURGERY

## 2024-04-22 PROCEDURE — 1200000000 HC SEMI PRIVATE

## 2024-04-22 PROCEDURE — 8E0W4CZ ROBOTIC ASSISTED PROCEDURE OF TRUNK REGION, PERCUTANEOUS ENDOSCOPIC APPROACH: ICD-10-PCS | Performed by: SURGERY

## 2024-04-22 PROCEDURE — A4216 STERILE WATER/SALINE, 10 ML: HCPCS | Performed by: NURSE PRACTITIONER

## 2024-04-22 PROCEDURE — 2580000003 HC RX 258: Performed by: INTERNAL MEDICINE

## 2024-04-22 PROCEDURE — 6360000002 HC RX W HCPCS: Performed by: NURSE PRACTITIONER

## 2024-04-22 PROCEDURE — 2500000003 HC RX 250 WO HCPCS: Performed by: NURSE ANESTHETIST, CERTIFIED REGISTERED

## 2024-04-22 PROCEDURE — 2580000003 HC RX 258: Performed by: NURSE PRACTITIONER

## 2024-04-22 PROCEDURE — A4216 STERILE WATER/SALINE, 10 ML: HCPCS | Performed by: INTERNAL MEDICINE

## 2024-04-22 PROCEDURE — 6360000002 HC RX W HCPCS: Performed by: NURSE ANESTHETIST, CERTIFIED REGISTERED

## 2024-04-22 PROCEDURE — G0378 HOSPITAL OBSERVATION PER HR: HCPCS

## 2024-04-22 PROCEDURE — 80076 HEPATIC FUNCTION PANEL: CPT

## 2024-04-22 PROCEDURE — 2580000003 HC RX 258

## 2024-04-22 PROCEDURE — BF131ZZ FLUOROSCOPY OF GALLBLADDER AND BILE DUCTS USING LOW OSMOLAR CONTRAST: ICD-10-PCS | Performed by: SURGERY

## 2024-04-22 PROCEDURE — 2580000003 HC RX 258: Performed by: SURGERY

## 2024-04-22 PROCEDURE — 2500000003 HC RX 250 WO HCPCS: Performed by: SURGERY

## 2024-04-22 PROCEDURE — 2580000003 HC RX 258: Performed by: ANESTHESIOLOGY

## 2024-04-22 PROCEDURE — 80048 BASIC METABOLIC PNL TOTAL CA: CPT

## 2024-04-22 PROCEDURE — C9113 INJ PANTOPRAZOLE SODIUM, VIA: HCPCS | Performed by: NURSE PRACTITIONER

## 2024-04-22 PROCEDURE — 2709999900 HC NON-CHARGEABLE SUPPLY: Performed by: SURGERY

## 2024-04-22 PROCEDURE — 6360000002 HC RX W HCPCS: Performed by: SURGERY

## 2024-04-22 PROCEDURE — 6360000002 HC RX W HCPCS: Performed by: ANESTHESIOLOGY

## 2024-04-22 PROCEDURE — 3600000019 HC SURGERY ROBOT ADDTL 15MIN: Performed by: SURGERY

## 2024-04-22 PROCEDURE — 36415 COLL VENOUS BLD VENIPUNCTURE: CPT

## 2024-04-22 PROCEDURE — 85025 COMPLETE CBC W/AUTO DIFF WBC: CPT

## 2024-04-22 PROCEDURE — 88304 TISSUE EXAM BY PATHOLOGIST: CPT

## 2024-04-22 PROCEDURE — 2500000003 HC RX 250 WO HCPCS: Performed by: ANESTHESIOLOGY

## 2024-04-22 PROCEDURE — 83690 ASSAY OF LIPASE: CPT

## 2024-04-22 PROCEDURE — S2900 ROBOTIC SURGICAL SYSTEM: HCPCS | Performed by: SURGERY

## 2024-04-22 PROCEDURE — C1889 IMPLANT/INSERT DEVICE, NOC: HCPCS | Performed by: SURGERY

## 2024-04-22 PROCEDURE — 7100000000 HC PACU RECOVERY - FIRST 15 MIN: Performed by: SURGERY

## 2024-04-22 PROCEDURE — 7100000001 HC PACU RECOVERY - ADDTL 15 MIN: Performed by: SURGERY

## 2024-04-22 PROCEDURE — 6360000004 HC RX CONTRAST MEDICATION: Performed by: SURGERY

## 2024-04-22 PROCEDURE — 6360000002 HC RX W HCPCS: Performed by: INTERNAL MEDICINE

## 2024-04-22 PROCEDURE — 3600000009 HC SURGERY ROBOT BASE: Performed by: SURGERY

## 2024-04-22 PROCEDURE — 6370000000 HC RX 637 (ALT 250 FOR IP): Performed by: INTERNAL MEDICINE

## 2024-04-22 PROCEDURE — 3700000000 HC ANESTHESIA ATTENDED CARE: Performed by: SURGERY

## 2024-04-22 PROCEDURE — 0FT44ZZ RESECTION OF GALLBLADDER, PERCUTANEOUS ENDOSCOPIC APPROACH: ICD-10-PCS | Performed by: SURGERY

## 2024-04-22 PROCEDURE — 6370000000 HC RX 637 (ALT 250 FOR IP): Performed by: SURGERY

## 2024-04-22 PROCEDURE — 99232 SBSQ HOSP IP/OBS MODERATE 35: CPT | Performed by: STUDENT IN AN ORGANIZED HEALTH CARE EDUCATION/TRAINING PROGRAM

## 2024-04-22 PROCEDURE — 82947 ASSAY GLUCOSE BLOOD QUANT: CPT

## 2024-04-22 PROCEDURE — C9113 INJ PANTOPRAZOLE SODIUM, VIA: HCPCS | Performed by: INTERNAL MEDICINE

## 2024-04-22 DEVICE — CLIP INT L POLYMER LOK LIG HEM O LOK (6EA/PK): Type: IMPLANTABLE DEVICE | Site: ABDOMEN | Status: FUNCTIONAL

## 2024-04-22 RX ORDER — FENTANYL CITRATE 50 UG/ML
25 INJECTION, SOLUTION INTRAMUSCULAR; INTRAVENOUS EVERY 5 MIN PRN
Status: DISCONTINUED | OUTPATIENT
Start: 2024-04-22 | End: 2024-04-22 | Stop reason: HOSPADM

## 2024-04-22 RX ORDER — ONDANSETRON 2 MG/ML
INJECTION INTRAMUSCULAR; INTRAVENOUS PRN
Status: DISCONTINUED | OUTPATIENT
Start: 2024-04-22 | End: 2024-04-22 | Stop reason: SDUPTHER

## 2024-04-22 RX ORDER — HYDROCODONE BITARTRATE AND ACETAMINOPHEN 5; 325 MG/1; MG/1
1 TABLET ORAL EVERY 6 HOURS PRN
Status: DISCONTINUED | OUTPATIENT
Start: 2024-04-22 | End: 2024-04-23

## 2024-04-22 RX ORDER — HYDROMORPHONE HYDROCHLORIDE 1 MG/ML
1 INJECTION, SOLUTION INTRAMUSCULAR; INTRAVENOUS; SUBCUTANEOUS
Status: DISCONTINUED | OUTPATIENT
Start: 2024-04-22 | End: 2024-04-24

## 2024-04-22 RX ORDER — SODIUM CHLORIDE, SODIUM LACTATE, POTASSIUM CHLORIDE, CALCIUM CHLORIDE 600; 310; 30; 20 MG/100ML; MG/100ML; MG/100ML; MG/100ML
INJECTION, SOLUTION INTRAVENOUS CONTINUOUS PRN
Status: DISCONTINUED | OUTPATIENT
Start: 2024-04-22 | End: 2024-04-22 | Stop reason: SDUPTHER

## 2024-04-22 RX ORDER — CEFAZOLIN SODIUM 1 G/3ML
INJECTION, POWDER, FOR SOLUTION INTRAMUSCULAR; INTRAVENOUS PRN
Status: DISCONTINUED | OUTPATIENT
Start: 2024-04-22 | End: 2024-04-22 | Stop reason: SDUPTHER

## 2024-04-22 RX ORDER — INSULIN LISPRO 100 [IU]/ML
0-4 INJECTION, SOLUTION INTRAVENOUS; SUBCUTANEOUS
Status: DISCONTINUED | OUTPATIENT
Start: 2024-04-23 | End: 2024-04-25 | Stop reason: HOSPADM

## 2024-04-22 RX ORDER — ROCURONIUM BROMIDE 50 MG/5 ML
SYRINGE (ML) INTRAVENOUS PRN
Status: DISCONTINUED | OUTPATIENT
Start: 2024-04-22 | End: 2024-04-22 | Stop reason: SDUPTHER

## 2024-04-22 RX ORDER — MORPHINE SULFATE 10 MG/ML
INJECTION INTRAVENOUS PRN
Status: DISCONTINUED | OUTPATIENT
Start: 2024-04-22 | End: 2024-04-22

## 2024-04-22 RX ORDER — SODIUM CHLORIDE 0.9 % (FLUSH) 0.9 %
5-40 SYRINGE (ML) INJECTION EVERY 12 HOURS SCHEDULED
Status: DISCONTINUED | OUTPATIENT
Start: 2024-04-22 | End: 2024-04-22 | Stop reason: HOSPADM

## 2024-04-22 RX ORDER — ONDANSETRON 2 MG/ML
4 INJECTION INTRAMUSCULAR; INTRAVENOUS
Status: DISCONTINUED | OUTPATIENT
Start: 2024-04-22 | End: 2024-04-22 | Stop reason: HOSPADM

## 2024-04-22 RX ORDER — FENTANYL CITRATE 50 UG/ML
INJECTION, SOLUTION INTRAMUSCULAR; INTRAVENOUS PRN
Status: DISCONTINUED | OUTPATIENT
Start: 2024-04-22 | End: 2024-04-22

## 2024-04-22 RX ORDER — GLYCOPYRROLATE 0.2 MG/ML
INJECTION INTRAMUSCULAR; INTRAVENOUS PRN
Status: DISCONTINUED | OUTPATIENT
Start: 2024-04-22 | End: 2024-04-22 | Stop reason: SDUPTHER

## 2024-04-22 RX ORDER — PROPOFOL 10 MG/ML
INJECTION, EMULSION INTRAVENOUS PRN
Status: DISCONTINUED | OUTPATIENT
Start: 2024-04-22 | End: 2024-04-22 | Stop reason: SDUPTHER

## 2024-04-22 RX ORDER — DEXTROSE MONOHYDRATE 100 MG/ML
INJECTION, SOLUTION INTRAVENOUS CONTINUOUS PRN
Status: DISCONTINUED | OUTPATIENT
Start: 2024-04-22 | End: 2024-04-25 | Stop reason: HOSPADM

## 2024-04-22 RX ORDER — KETOROLAC TROMETHAMINE 30 MG/ML
30 INJECTION, SOLUTION INTRAMUSCULAR; INTRAVENOUS ONCE
Status: COMPLETED | OUTPATIENT
Start: 2024-04-22 | End: 2024-04-22

## 2024-04-22 RX ORDER — HYDROMORPHONE HYDROCHLORIDE 1 MG/ML
0.5 INJECTION, SOLUTION INTRAMUSCULAR; INTRAVENOUS; SUBCUTANEOUS EVERY 5 MIN PRN
Status: DISCONTINUED | OUTPATIENT
Start: 2024-04-22 | End: 2024-04-22 | Stop reason: HOSPADM

## 2024-04-22 RX ORDER — HYDROCODONE BITARTRATE AND ACETAMINOPHEN 5; 325 MG/1; MG/1
2 TABLET ORAL EVERY 6 HOURS PRN
Status: DISCONTINUED | OUTPATIENT
Start: 2024-04-22 | End: 2024-04-23

## 2024-04-22 RX ORDER — INSULIN LISPRO 100 [IU]/ML
0-4 INJECTION, SOLUTION INTRAVENOUS; SUBCUTANEOUS NIGHTLY
Status: DISCONTINUED | OUTPATIENT
Start: 2024-04-22 | End: 2024-04-25 | Stop reason: HOSPADM

## 2024-04-22 RX ORDER — SODIUM CHLORIDE 9 MG/ML
INJECTION, SOLUTION INTRAVENOUS PRN
Status: DISCONTINUED | OUTPATIENT
Start: 2024-04-22 | End: 2024-04-22 | Stop reason: HOSPADM

## 2024-04-22 RX ORDER — SODIUM CHLORIDE 0.9 % (FLUSH) 0.9 %
5-40 SYRINGE (ML) INJECTION PRN
Status: DISCONTINUED | OUTPATIENT
Start: 2024-04-22 | End: 2024-04-22 | Stop reason: HOSPADM

## 2024-04-22 RX ORDER — LIDOCAINE HYDROCHLORIDE 20 MG/ML
INJECTION, SOLUTION INFILTRATION; PERINEURAL PRN
Status: DISCONTINUED | OUTPATIENT
Start: 2024-04-22 | End: 2024-04-22 | Stop reason: SDUPTHER

## 2024-04-22 RX ORDER — NALOXONE HYDROCHLORIDE 0.4 MG/ML
INJECTION, SOLUTION INTRAMUSCULAR; INTRAVENOUS; SUBCUTANEOUS PRN
Status: DISCONTINUED | OUTPATIENT
Start: 2024-04-22 | End: 2024-04-22 | Stop reason: HOSPADM

## 2024-04-22 RX ORDER — DEXAMETHASONE SODIUM PHOSPHATE 10 MG/ML
INJECTION, SOLUTION INTRAMUSCULAR; INTRAVENOUS PRN
Status: DISCONTINUED | OUTPATIENT
Start: 2024-04-22 | End: 2024-04-22 | Stop reason: SDUPTHER

## 2024-04-22 RX ADMIN — LIDOCAINE HYDROCHLORIDE 50 MG: 20 INJECTION, SOLUTION INFILTRATION; PERINEURAL at 15:47

## 2024-04-22 RX ADMIN — SODIUM CHLORIDE: 9 INJECTION, SOLUTION INTRAVENOUS at 06:02

## 2024-04-22 RX ADMIN — BUTALBITAL, ACETAMINOPHEN, AND CAFFEINE 1 TABLET: 50; 325; 40 TABLET ORAL at 14:10

## 2024-04-22 RX ADMIN — CEFAZOLIN 2 G: 1 INJECTION, POWDER, FOR SOLUTION INTRAMUSCULAR; INTRAVENOUS at 15:50

## 2024-04-22 RX ADMIN — HYDROMORPHONE HYDROCHLORIDE 1 MG: 1 INJECTION, SOLUTION INTRAMUSCULAR; INTRAVENOUS; SUBCUTANEOUS at 21:22

## 2024-04-22 RX ADMIN — HYDROMORPHONE HYDROCHLORIDE 0.5 MG: 1 INJECTION, SOLUTION INTRAMUSCULAR; INTRAVENOUS; SUBCUTANEOUS at 17:49

## 2024-04-22 RX ADMIN — HYDROMORPHONE HYDROCHLORIDE 0.5 MG: 1 INJECTION, SOLUTION INTRAMUSCULAR; INTRAVENOUS; SUBCUTANEOUS at 17:30

## 2024-04-22 RX ADMIN — SODIUM CHLORIDE, PRESERVATIVE FREE 40 MG: 5 INJECTION INTRAVENOUS at 21:46

## 2024-04-22 RX ADMIN — SODIUM CHLORIDE 2000 MG: 9 INJECTION, SOLUTION INTRAVENOUS at 21:54

## 2024-04-22 RX ADMIN — SODIUM CHLORIDE, PRESERVATIVE FREE 40 MG: 5 INJECTION INTRAVENOUS at 08:07

## 2024-04-22 RX ADMIN — SUGAMMADEX 200 MG: 100 INJECTION, SOLUTION INTRAVENOUS at 16:54

## 2024-04-22 RX ADMIN — HYDROCODONE BITARTRATE AND ACETAMINOPHEN 2 TABLET: 5; 325 TABLET ORAL at 18:16

## 2024-04-22 RX ADMIN — GLYCOPYRROLATE 0.3 MG: 0.2 INJECTION INTRAMUSCULAR; INTRAVENOUS at 16:10

## 2024-04-22 RX ADMIN — HYDROMORPHONE HYDROCHLORIDE 0.5 MG: 1 INJECTION, SOLUTION INTRAMUSCULAR; INTRAVENOUS; SUBCUTANEOUS at 17:22

## 2024-04-22 RX ADMIN — SODIUM CHLORIDE, PRESERVATIVE FREE 10 ML: 5 INJECTION INTRAVENOUS at 07:40

## 2024-04-22 RX ADMIN — ONDANSETRON 4 MG: 2 INJECTION INTRAMUSCULAR; INTRAVENOUS at 16:50

## 2024-04-22 RX ADMIN — KETOROLAC TROMETHAMINE 30 MG: 30 INJECTION, SOLUTION INTRAMUSCULAR at 18:22

## 2024-04-22 RX ADMIN — FENTANYL CITRATE 100 MCG: 50 INJECTION INTRAMUSCULAR; INTRAVENOUS at 15:47

## 2024-04-22 RX ADMIN — HYDROMORPHONE HYDROCHLORIDE 1 MG: 1 INJECTION, SOLUTION INTRAMUSCULAR; INTRAVENOUS; SUBCUTANEOUS at 06:06

## 2024-04-22 RX ADMIN — PROPOFOL 150 MG: 10 INJECTION, EMULSION INTRAVENOUS at 15:47

## 2024-04-22 RX ADMIN — Medication 50 MG: at 15:47

## 2024-04-22 RX ADMIN — SODIUM CHLORIDE, POTASSIUM CHLORIDE, SODIUM LACTATE AND CALCIUM CHLORIDE: 600; 310; 30; 20 INJECTION, SOLUTION INTRAVENOUS at 16:13

## 2024-04-22 RX ADMIN — DEXAMETHASONE SODIUM PHOSPHATE 10 MG: 10 INJECTION, SOLUTION INTRAMUSCULAR; INTRAVENOUS at 15:57

## 2024-04-22 RX ADMIN — HYDROMORPHONE HYDROCHLORIDE 1 MG: 1 INJECTION, SOLUTION INTRAMUSCULAR; INTRAVENOUS; SUBCUTANEOUS at 11:53

## 2024-04-22 RX ADMIN — MORPHINE SULFATE 5 MG: 10 INJECTION INTRAVENOUS at 16:17

## 2024-04-22 ASSESSMENT — PAIN DESCRIPTION - LOCATION
LOCATION: ABDOMEN
LOCATION: ABDOMEN
LOCATION: HEAD
LOCATION: ABDOMEN
LOCATION: ABDOMEN

## 2024-04-22 ASSESSMENT — ENCOUNTER SYMPTOMS
APNEA: 0
EYE ITCHING: 0
DIARRHEA: 0
FACIAL SWELLING: 0
CHEST TIGHTNESS: 0
CONSTIPATION: 0
ABDOMINAL PAIN: 1
ABDOMINAL DISTENTION: 1
COLOR CHANGE: 0
BACK PAIN: 0
EYE DISCHARGE: 0

## 2024-04-22 ASSESSMENT — PAIN SCALES - GENERAL
PAINLEVEL_OUTOF10: 10
PAINLEVEL_OUTOF10: 9
PAINLEVEL_OUTOF10: 4
PAINLEVEL_OUTOF10: 9
PAINLEVEL_OUTOF10: 10
PAINLEVEL_OUTOF10: 6
PAINLEVEL_OUTOF10: 8
PAINLEVEL_OUTOF10: 9
PAINLEVEL_OUTOF10: 4
PAINLEVEL_OUTOF10: 9
PAINLEVEL_OUTOF10: 1
PAINLEVEL_OUTOF10: 9

## 2024-04-22 ASSESSMENT — PAIN DESCRIPTION - DESCRIPTORS
DESCRIPTORS: SQUEEZING
DESCRIPTORS: ACHING;SHARP;PRESSURE
DESCRIPTORS: SHARP;SQUEEZING
DESCRIPTORS: ACHING
DESCRIPTORS: STABBING

## 2024-04-22 ASSESSMENT — PAIN DESCRIPTION - PAIN TYPE
TYPE: ACUTE PAIN
TYPE: ACUTE PAIN;SURGICAL PAIN
TYPE: SURGICAL PAIN

## 2024-04-22 ASSESSMENT — PAIN DESCRIPTION - ORIENTATION
ORIENTATION: MID;UPPER
ORIENTATION: RIGHT
ORIENTATION: UPPER
ORIENTATION: LEFT;MID;UPPER
ORIENTATION: LOWER

## 2024-04-22 ASSESSMENT — PAIN DESCRIPTION - DIRECTION
RADIATING_TOWARDS: BACK
RADIATING_TOWARDS: BACK

## 2024-04-22 ASSESSMENT — PAIN - FUNCTIONAL ASSESSMENT
PAIN_FUNCTIONAL_ASSESSMENT: FACE, LEGS, ACTIVITY, CRY, AND CONSOLABILITY (FLACC)
PAIN_FUNCTIONAL_ASSESSMENT: PREVENTS OR INTERFERES SOME ACTIVE ACTIVITIES AND ADLS
PAIN_FUNCTIONAL_ASSESSMENT: PREVENTS OR INTERFERES SOME ACTIVE ACTIVITIES AND ADLS
PAIN_FUNCTIONAL_ASSESSMENT: ACTIVITIES ARE NOT PREVENTED
PAIN_FUNCTIONAL_ASSESSMENT: ACTIVITIES ARE NOT PREVENTED

## 2024-04-22 ASSESSMENT — PAIN DESCRIPTION - ONSET: ONSET: SUDDEN

## 2024-04-22 ASSESSMENT — PAIN DESCRIPTION - FREQUENCY
FREQUENCY: CONTINUOUS

## 2024-04-22 NOTE — ANESTHESIA POSTPROCEDURE EVALUATION
Department of Anesthesiology  Postprocedure Note    Patient: Mary Gamboa  MRN: [id number]  YOB: 1965  Date of evaluation: 4/22/2024    Procedure Summary       Date: 04/22/24 Room / Location: [address]    Anesthesia Start: 1544 Anesthesia Stop: 1710    Procedure: CHOLECYSTECTOMY LAPAROSCOPIC ROBOTIC XI WITH CHOLEANGIOGRAM Diagnosis:       Acute cholecystitis      (Acute cholecystitis [K81.0])    Surgeons: Drew French MD Responsible Provider: Andrea Pimentel DO    Anesthesia Type: General ASA Status: 3            Anesthesia Type: General    Karma Phase I:      Karma Phase II:      Anesthesia Post Evaluation    Patient location during evaluation: PACU  Patient participation: complete - patient participated  Level of consciousness: awake and alert  Airway patency: patent  Nausea & Vomiting: no nausea and no vomiting  Cardiovascular status: hemodynamically stable  Respiratory status: acceptable  Hydration status: stable  Pain management: adequate    No notable events documented.

## 2024-04-22 NOTE — ANESTHESIA PRE PROCEDURE
Department of Anesthesiology  Preprocedure Note       Name:  Mary Gamboa   Age:  59 y.o.  :  1965                                          MRN:  [id number]         Date:  2024      Surgeon: Surgeon(s):  Drew French MD    Procedure: Procedure(s):  CHOLECYSTECTOMY LAPAROSCOPIC ROBOTIC XI WITH CHOLEANGIOGRAM    Medications prior to admission:   Prior to Admission medications    Medication Sig Start Date End Date Taking? Authorizing Provider   Zinc Acetate, Oral, (ZINC ACETATE PO) Take 1 tablet by mouth in the morning and at bedtime   Yes Provider, MD Beto   MAGNESIUM PO Take 1 tablet by mouth 2 times daily   Yes Provider, MD Beto   Turmeric (QC TUMERIC COMPLEX PO) Take 1 tablet by mouth 2 times daily   Yes Provider, MD Beto   APPLE CIDER VINEGAR PO Take 1 capsule by mouth 2 times daily   Yes Provider, MD Beto   CINNAMON PO Take 1 tablet by mouth daily   Yes Provider, MD Beto   Misc Natural Products (RELAX & SLEEP PO) Take 2 tablets by mouth nightly   Yes Provider, MD Beto       Current medications:    Current Facility-Administered Medications   Medication Dose Route Frequency Provider Last Rate Last Admin    HYDROcodone-acetaminophen (NORCO) 5-325 MG per tablet 1 tablet  1 tablet Oral Q6H PRN Drew French MD        HYDROcodone-acetaminophen (NORCO) 5-325 MG per tablet 2 tablet  2 tablet Oral Q6H PRN Drew French MD        naloxone 0.4 mg in 10 mL sodium chloride syringe   IntraVENous PRN Andrea Pimentel,         sodium chloride flush 0.9 % injection 5-40 mL  5-40 mL IntraVENous 2 times per day Andrea Pimentel, DO        sodium chloride flush 0.9 % injection 5-40 mL  5-40 mL IntraVENous PRN Andrea Pimentel, DO        0.9 % sodium chloride infusion   IntraVENous PRN Andrae Pimentel,         fentaNYL (SUBLIMAZE) injection 25 mcg  25 mcg IntraVENous Q5 Min PRN Andrea Pimentel,         HYDROmorphone HCl PF (DILAUDID)

## 2024-04-22 NOTE — PLAN OF CARE
Problem: Discharge Planning  Goal: Discharge to home or other facility with appropriate resources  Outcome: Progressing  Flowsheets (Taken 4/22/2024 0745)  Discharge to home or other facility with appropriate resources:   Identify barriers to discharge with patient and caregiver   Arrange for needed discharge resources and transportation as appropriate   Identify discharge learning needs (meds, wound care, etc)   Refer to discharge planning if patient needs post-hospital services based on physician order or complex needs related to functional status, cognitive ability or social support system     Problem: Pain  Goal: Verbalizes/displays adequate comfort level or baseline comfort level  Outcome: Progressing     Problem: Gastrointestinal - Adult  Goal: Minimal or absence of nausea and vomiting  Outcome: Progressing  Flowsheets (Taken 4/22/2024 0745)  Minimal or absence of nausea and vomiting:   Administer IV fluids as ordered to ensure adequate hydration   Maintain NPO status until nausea and vomiting are resolved

## 2024-04-23 PROBLEM — K82.8 BILIARY DYSKINESIA: Status: ACTIVE | Noted: 2024-04-21

## 2024-04-23 PROBLEM — K80.50 BILIARY COLIC: Status: ACTIVE | Noted: 2024-04-23

## 2024-04-23 LAB
ALBUMIN SERPL-MCNC: 3.6 G/DL (ref 3.5–5.2)
ALP SERPL-CCNC: 141 U/L (ref 35–104)
ALT SERPL-CCNC: 356 U/L (ref 5–33)
ANION GAP SERPL CALCULATED.3IONS-SCNC: 8 MMOL/L (ref 9–17)
AST SERPL-CCNC: 193 U/L
BILIRUB SERPL-MCNC: 0.3 MG/DL (ref 0.3–1.2)
BUN SERPL-MCNC: 9 MG/DL (ref 6–20)
BUN/CREAT SERPL: 18 (ref 9–20)
CALCIUM SERPL-MCNC: 8.8 MG/DL (ref 8.6–10.4)
CHLORIDE SERPL-SCNC: 104 MMOL/L (ref 98–107)
CO2 SERPL-SCNC: 27 MMOL/L (ref 20–31)
CREAT SERPL-MCNC: 0.5 MG/DL (ref 0.5–0.9)
GFR SERPL CREATININE-BSD FRML MDRD: >90 ML/MIN/1.73M2
GLUCOSE BLD-MCNC: 102 MG/DL (ref 65–105)
GLUCOSE BLD-MCNC: 83 MG/DL (ref 65–105)
GLUCOSE BLD-MCNC: 91 MG/DL (ref 65–105)
GLUCOSE BLD-MCNC: 98 MG/DL (ref 65–105)
GLUCOSE SERPL-MCNC: 124 MG/DL (ref 70–99)
MITOCHONDRIA M2 IGG SER-ACNC: 1.2 U/ML (ref 0–4)
POTASSIUM SERPL-SCNC: 3.7 MMOL/L (ref 3.7–5.3)
PROT SERPL-MCNC: 6.3 G/DL (ref 6.4–8.3)
SMOOTH MUSCLE ANTIBODY: 6 UNITS (ref 0–19)
SODIUM SERPL-SCNC: 139 MMOL/L (ref 135–144)

## 2024-04-23 PROCEDURE — 6360000002 HC RX W HCPCS: Performed by: NURSE PRACTITIONER

## 2024-04-23 PROCEDURE — 2580000003 HC RX 258: Performed by: NURSE PRACTITIONER

## 2024-04-23 PROCEDURE — 6370000000 HC RX 637 (ALT 250 FOR IP): Performed by: INTERNAL MEDICINE

## 2024-04-23 PROCEDURE — 82947 ASSAY GLUCOSE BLOOD QUANT: CPT

## 2024-04-23 PROCEDURE — 36415 COLL VENOUS BLD VENIPUNCTURE: CPT

## 2024-04-23 PROCEDURE — 80053 COMPREHEN METABOLIC PANEL: CPT

## 2024-04-23 PROCEDURE — A4216 STERILE WATER/SALINE, 10 ML: HCPCS | Performed by: NURSE PRACTITIONER

## 2024-04-23 PROCEDURE — C9113 INJ PANTOPRAZOLE SODIUM, VIA: HCPCS | Performed by: NURSE PRACTITIONER

## 2024-04-23 PROCEDURE — 96376 TX/PRO/DX INJ SAME DRUG ADON: CPT

## 2024-04-23 PROCEDURE — 6370000000 HC RX 637 (ALT 250 FOR IP): Performed by: SURGERY

## 2024-04-23 PROCEDURE — G0378 HOSPITAL OBSERVATION PER HR: HCPCS

## 2024-04-23 PROCEDURE — 99232 SBSQ HOSP IP/OBS MODERATE 35: CPT | Performed by: INTERNAL MEDICINE

## 2024-04-23 PROCEDURE — 1200000000 HC SEMI PRIVATE

## 2024-04-23 RX ORDER — LIDOCAINE HYDROCHLORIDE 20 MG/ML
5 SOLUTION OROPHARYNGEAL ONCE
Status: COMPLETED | OUTPATIENT
Start: 2024-04-23 | End: 2024-04-23

## 2024-04-23 RX ORDER — OXYCODONE HYDROCHLORIDE 5 MG/1
10 TABLET ORAL EVERY 4 HOURS PRN
Status: DISCONTINUED | OUTPATIENT
Start: 2024-04-23 | End: 2024-04-25 | Stop reason: HOSPADM

## 2024-04-23 RX ORDER — SODIUM CHLORIDE 9 MG/ML
INJECTION, SOLUTION INTRAVENOUS CONTINUOUS
Status: DISCONTINUED | OUTPATIENT
Start: 2024-04-23 | End: 2024-04-25 | Stop reason: HOSPADM

## 2024-04-23 RX ORDER — ONDANSETRON 4 MG/1
4 TABLET, ORALLY DISINTEGRATING ORAL EVERY 8 HOURS PRN
Qty: 6 TABLET | Refills: 0 | Status: SHIPPED | OUTPATIENT
Start: 2024-04-23 | End: 2024-04-25

## 2024-04-23 RX ORDER — MAGNESIUM HYDROXIDE/ALUMINUM HYDROXICE/SIMETHICONE 120; 1200; 1200 MG/30ML; MG/30ML; MG/30ML
30 SUSPENSION ORAL ONCE
Status: COMPLETED | OUTPATIENT
Start: 2024-04-23 | End: 2024-04-23

## 2024-04-23 RX ORDER — OXYCODONE HYDROCHLORIDE 5 MG/1
5 TABLET ORAL EVERY 4 HOURS PRN
Status: DISCONTINUED | OUTPATIENT
Start: 2024-04-23 | End: 2024-04-25 | Stop reason: HOSPADM

## 2024-04-23 RX ORDER — HYDROCODONE BITARTRATE AND ACETAMINOPHEN 5; 325 MG/1; MG/1
2 TABLET ORAL EVERY 6 HOURS PRN
Qty: 24 TABLET | Refills: 0 | Status: SHIPPED | OUTPATIENT
Start: 2024-04-23 | End: 2024-04-24 | Stop reason: HOSPADM

## 2024-04-23 RX ADMIN — HYDROCODONE BITARTRATE AND ACETAMINOPHEN 2 TABLET: 5; 325 TABLET ORAL at 07:04

## 2024-04-23 RX ADMIN — OXYCODONE HYDROCHLORIDE 10 MG: 5 TABLET ORAL at 22:50

## 2024-04-23 RX ADMIN — SODIUM CHLORIDE, PRESERVATIVE FREE 40 MG: 5 INJECTION INTRAVENOUS at 21:49

## 2024-04-23 RX ADMIN — HYDROCODONE BITARTRATE AND ACETAMINOPHEN 2 TABLET: 5; 325 TABLET ORAL at 00:56

## 2024-04-23 RX ADMIN — LIDOCAINE HYDROCHLORIDE: 20 SOLUTION ORAL at 14:48

## 2024-04-23 RX ADMIN — SODIUM CHLORIDE: 9 INJECTION, SOLUTION INTRAVENOUS at 23:59

## 2024-04-23 RX ADMIN — SODIUM CHLORIDE, PRESERVATIVE FREE 40 MG: 5 INJECTION INTRAVENOUS at 10:08

## 2024-04-23 RX ADMIN — OXYCODONE HYDROCHLORIDE 10 MG: 5 TABLET ORAL at 11:58

## 2024-04-23 RX ADMIN — OXYCODONE HYDROCHLORIDE 10 MG: 5 TABLET ORAL at 18:46

## 2024-04-23 RX ADMIN — LIDOCAINE HYDROCHLORIDE 5 ML: 20 SOLUTION ORAL at 20:30

## 2024-04-23 RX ADMIN — ALUMINUM HYDROXIDE, MAGNESIUM HYDROXIDE, AND SIMETHICONE 30 ML: 200; 200; 20 SUSPENSION ORAL at 20:30

## 2024-04-23 ASSESSMENT — PAIN DESCRIPTION - PAIN TYPE
TYPE: ACUTE PAIN;SURGICAL PAIN
TYPE: ACUTE PAIN;SURGICAL PAIN
TYPE: SURGICAL PAIN;ACUTE PAIN

## 2024-04-23 ASSESSMENT — PAIN DESCRIPTION - DIRECTION
RADIATING_TOWARDS: BACK
RADIATING_TOWARDS: BACK

## 2024-04-23 ASSESSMENT — PAIN DESCRIPTION - ORIENTATION
ORIENTATION: RIGHT;UPPER
ORIENTATION: MID;UPPER
ORIENTATION: UPPER;MID
ORIENTATION: UPPER
ORIENTATION: MID;UPPER
ORIENTATION: UPPER;MID

## 2024-04-23 ASSESSMENT — PAIN DESCRIPTION - DESCRIPTORS
DESCRIPTORS: SHARP
DESCRIPTORS: SHARP
DESCRIPTORS: BURNING
DESCRIPTORS: SHARP
DESCRIPTORS: SHARP
DESCRIPTORS: ACHING

## 2024-04-23 ASSESSMENT — PAIN DESCRIPTION - FREQUENCY
FREQUENCY: CONTINUOUS

## 2024-04-23 ASSESSMENT — PAIN - FUNCTIONAL ASSESSMENT
PAIN_FUNCTIONAL_ASSESSMENT: ACTIVITIES ARE NOT PREVENTED

## 2024-04-23 ASSESSMENT — PAIN DESCRIPTION - ONSET: ONSET: SUDDEN

## 2024-04-23 ASSESSMENT — PAIN DESCRIPTION - LOCATION
LOCATION: ABDOMEN
LOCATION: CHEST
LOCATION: ABDOMEN

## 2024-04-23 ASSESSMENT — PAIN SCALES - GENERAL
PAINLEVEL_OUTOF10: 7
PAINLEVEL_OUTOF10: 8
PAINLEVEL_OUTOF10: 7
PAINLEVEL_OUTOF10: 3
PAINLEVEL_OUTOF10: 7

## 2024-04-23 NOTE — DISCHARGE INSTRUCTIONS
Expect not great appetite for couple days and also very common not to have a bowel movement for couple of days.  May shower as of tomorrow 4/24/24  No heavy lifting for about 4 weeks.  Follow-up with Dr. French in our office in [place] in about 1-1/2 to 2 weeks and may call in the meantime for any new interval changes.  Establish with a primary care provider with list of physician offices provided to you

## 2024-04-24 ENCOUNTER — ANESTHESIA EVENT (OUTPATIENT)
Dept: OPERATING ROOM | Age: 59
DRG: 419 | End: 2024-04-24
Payer: COMMERCIAL

## 2024-04-24 ENCOUNTER — ANESTHESIA (OUTPATIENT)
Dept: OPERATING ROOM | Age: 59
DRG: 419 | End: 2024-04-24
Payer: COMMERCIAL

## 2024-04-24 ENCOUNTER — APPOINTMENT (OUTPATIENT)
Dept: GENERAL RADIOLOGY | Age: 59
DRG: 419 | End: 2024-04-24
Attending: FAMILY MEDICINE
Payer: COMMERCIAL

## 2024-04-24 LAB
ALBUMIN SERPL-MCNC: 3.2 G/DL (ref 3.5–5.2)
ALP SERPL-CCNC: 174 U/L (ref 35–104)
ALT SERPL-CCNC: 268 U/L (ref 5–33)
ANION GAP SERPL CALCULATED.3IONS-SCNC: 9 MMOL/L (ref 9–17)
AST SERPL-CCNC: 139 U/L
BASOPHILS # BLD: <0.03 K/UL (ref 0–0.2)
BASOPHILS NFR BLD: 0 % (ref 0–2)
BILIRUB SERPL-MCNC: 0.4 MG/DL (ref 0.3–1.2)
BUN SERPL-MCNC: 10 MG/DL (ref 6–20)
BUN/CREAT SERPL: 20 (ref 9–20)
CALCIUM SERPL-MCNC: 8.3 MG/DL (ref 8.6–10.4)
CHLORIDE SERPL-SCNC: 108 MMOL/L (ref 98–107)
CO2 SERPL-SCNC: 25 MMOL/L (ref 20–31)
CREAT SERPL-MCNC: 0.5 MG/DL (ref 0.5–0.9)
EOSINOPHIL # BLD: 0.08 K/UL (ref 0–0.44)
EOSINOPHILS RELATIVE PERCENT: 1 % (ref 1–4)
ERYTHROCYTE [DISTWIDTH] IN BLOOD BY AUTOMATED COUNT: 13.4 % (ref 11.8–14.4)
GFR SERPL CREATININE-BSD FRML MDRD: >90 ML/MIN/1.73M2
GLUCOSE BLD-MCNC: 152 MG/DL (ref 65–105)
GLUCOSE BLD-MCNC: 95 MG/DL (ref 65–105)
GLUCOSE BLD-MCNC: 98 MG/DL (ref 65–105)
GLUCOSE SERPL-MCNC: 92 MG/DL (ref 70–99)
HCT VFR BLD AUTO: 31.5 % (ref 36.3–47.1)
HGB BLD-MCNC: 9.8 G/DL (ref 11.9–15.1)
IMM GRANULOCYTES # BLD AUTO: 0.04 K/UL (ref 0–0.3)
IMM GRANULOCYTES NFR BLD: 1 %
LYMPHOCYTES NFR BLD: 1.7 K/UL (ref 1.1–3.7)
LYMPHOCYTES RELATIVE PERCENT: 30 % (ref 24–43)
MCH RBC QN AUTO: 28.1 PG (ref 25.2–33.5)
MCHC RBC AUTO-ENTMCNC: 31.1 G/DL (ref 28.4–34.8)
MCV RBC AUTO: 90.3 FL (ref 82.6–102.9)
MONOCYTES NFR BLD: 0.65 K/UL (ref 0.1–1.2)
MONOCYTES NFR BLD: 11 % (ref 3–12)
NEUTROPHILS NFR BLD: 57 % (ref 36–65)
NEUTS SEG NFR BLD: 3.22 K/UL (ref 1.5–8.1)
NRBC BLD-RTO: 0 PER 100 WBC
PLATELET # BLD AUTO: 232 K/UL (ref 138–453)
PMV BLD AUTO: 10.2 FL (ref 8.1–13.5)
POTASSIUM SERPL-SCNC: 3.7 MMOL/L (ref 3.7–5.3)
PROT SERPL-MCNC: 5.8 G/DL (ref 6.4–8.3)
RBC # BLD AUTO: 3.49 M/UL (ref 3.95–5.11)
SODIUM SERPL-SCNC: 142 MMOL/L (ref 135–144)
SURGICAL PATHOLOGY REPORT: NORMAL
WBC OTHER # BLD: 5.7 K/UL (ref 3.5–11.3)

## 2024-04-24 PROCEDURE — 6370000000 HC RX 637 (ALT 250 FOR IP): Performed by: INTERNAL MEDICINE

## 2024-04-24 PROCEDURE — A4216 STERILE WATER/SALINE, 10 ML: HCPCS | Performed by: NURSE PRACTITIONER

## 2024-04-24 PROCEDURE — 36415 COLL VENOUS BLD VENIPUNCTURE: CPT

## 2024-04-24 PROCEDURE — 6360000002 HC RX W HCPCS: Performed by: INTERNAL MEDICINE

## 2024-04-24 PROCEDURE — C9113 INJ PANTOPRAZOLE SODIUM, VIA: HCPCS | Performed by: NURSE PRACTITIONER

## 2024-04-24 PROCEDURE — 43259 EGD US EXAM DUODENUM/JEJUNUM: CPT | Performed by: INTERNAL MEDICINE

## 2024-04-24 PROCEDURE — 6360000002 HC RX W HCPCS: Performed by: NURSE ANESTHETIST, CERTIFIED REGISTERED

## 2024-04-24 PROCEDURE — 2709999900 HC NON-CHARGEABLE SUPPLY: Performed by: INTERNAL MEDICINE

## 2024-04-24 PROCEDURE — 2500000003 HC RX 250 WO HCPCS: Performed by: NURSE ANESTHETIST, CERTIFIED REGISTERED

## 2024-04-24 PROCEDURE — 88305 TISSUE EXAM BY PATHOLOGIST: CPT

## 2024-04-24 PROCEDURE — 0DB68ZX EXCISION OF STOMACH, VIA NATURAL OR ARTIFICIAL OPENING ENDOSCOPIC, DIAGNOSTIC: ICD-10-PCS | Performed by: INTERNAL MEDICINE

## 2024-04-24 PROCEDURE — 0DB98ZX EXCISION OF DUODENUM, VIA NATURAL OR ARTIFICIAL OPENING ENDOSCOPIC, DIAGNOSTIC: ICD-10-PCS | Performed by: INTERNAL MEDICINE

## 2024-04-24 PROCEDURE — 82947 ASSAY GLUCOSE BLOOD QUANT: CPT

## 2024-04-24 PROCEDURE — 2720000010 HC SURG SUPPLY STERILE: Performed by: INTERNAL MEDICINE

## 2024-04-24 PROCEDURE — 0DB38ZX EXCISION OF LOWER ESOPHAGUS, VIA NATURAL OR ARTIFICIAL OPENING ENDOSCOPIC, DIAGNOSTIC: ICD-10-PCS | Performed by: INTERNAL MEDICINE

## 2024-04-24 PROCEDURE — 3700000000 HC ANESTHESIA ATTENDED CARE: Performed by: INTERNAL MEDICINE

## 2024-04-24 PROCEDURE — G0378 HOSPITAL OBSERVATION PER HR: HCPCS

## 2024-04-24 PROCEDURE — 43239 EGD BIOPSY SINGLE/MULTIPLE: CPT | Performed by: INTERNAL MEDICINE

## 2024-04-24 PROCEDURE — 2580000003 HC RX 258: Performed by: NURSE PRACTITIONER

## 2024-04-24 PROCEDURE — 1200000000 HC SEMI PRIVATE

## 2024-04-24 PROCEDURE — 43264 ERCP REMOVE DUCT CALCULI: CPT | Performed by: INTERNAL MEDICINE

## 2024-04-24 PROCEDURE — 6360000002 HC RX W HCPCS: Performed by: NURSE PRACTITIONER

## 2024-04-24 PROCEDURE — 7100000000 HC PACU RECOVERY - FIRST 15 MIN: Performed by: INTERNAL MEDICINE

## 2024-04-24 PROCEDURE — BF111ZZ FLUOROSCOPY OF BILIARY AND PANCREATIC DUCTS USING LOW OSMOLAR CONTRAST: ICD-10-PCS | Performed by: INTERNAL MEDICINE

## 2024-04-24 PROCEDURE — 0F798ZZ DILATION OF COMMON BILE DUCT, VIA NATURAL OR ARTIFICIAL OPENING ENDOSCOPIC: ICD-10-PCS | Performed by: INTERNAL MEDICINE

## 2024-04-24 PROCEDURE — 7100000001 HC PACU RECOVERY - ADDTL 15 MIN: Performed by: INTERNAL MEDICINE

## 2024-04-24 PROCEDURE — C9113 INJ PANTOPRAZOLE SODIUM, VIA: HCPCS | Performed by: INTERNAL MEDICINE

## 2024-04-24 PROCEDURE — 99232 SBSQ HOSP IP/OBS MODERATE 35: CPT | Performed by: INTERNAL MEDICINE

## 2024-04-24 PROCEDURE — 96372 THER/PROPH/DIAG INJ SC/IM: CPT

## 2024-04-24 PROCEDURE — 6360000004 HC RX CONTRAST MEDICATION: Performed by: INTERNAL MEDICINE

## 2024-04-24 PROCEDURE — A4216 STERILE WATER/SALINE, 10 ML: HCPCS | Performed by: INTERNAL MEDICINE

## 2024-04-24 PROCEDURE — C1769 GUIDE WIRE: HCPCS | Performed by: INTERNAL MEDICINE

## 2024-04-24 PROCEDURE — 88342 IMHCHEM/IMCYTCHM 1ST ANTB: CPT

## 2024-04-24 PROCEDURE — 3609018500 HC EGD US SCOPE W/ADJACENT STRUCTURES: Performed by: INTERNAL MEDICINE

## 2024-04-24 PROCEDURE — 80053 COMPREHEN METABOLIC PANEL: CPT

## 2024-04-24 PROCEDURE — 2580000003 HC RX 258: Performed by: INTERNAL MEDICINE

## 2024-04-24 PROCEDURE — 43262 ENDO CHOLANGIOPANCREATOGRAPH: CPT | Performed by: INTERNAL MEDICINE

## 2024-04-24 PROCEDURE — 3700000001 HC ADD 15 MINUTES (ANESTHESIA): Performed by: INTERNAL MEDICINE

## 2024-04-24 PROCEDURE — 85025 COMPLETE CBC W/AUTO DIFF WBC: CPT

## 2024-04-24 PROCEDURE — 0F778ZZ DILATION OF COMMON HEPATIC DUCT, VIA NATURAL OR ARTIFICIAL OPENING ENDOSCOPIC: ICD-10-PCS | Performed by: INTERNAL MEDICINE

## 2024-04-24 RX ORDER — PROPOFOL 10 MG/ML
INJECTION, EMULSION INTRAVENOUS PRN
Status: DISCONTINUED | OUTPATIENT
Start: 2024-04-24 | End: 2024-04-24 | Stop reason: SDUPTHER

## 2024-04-24 RX ORDER — OXYCODONE HYDROCHLORIDE 5 MG/1
TABLET ORAL
Qty: 30 TABLET | Refills: 0 | Status: SHIPPED | OUTPATIENT
Start: 2024-04-24 | End: 2024-04-29

## 2024-04-24 RX ORDER — ONDANSETRON 2 MG/ML
4 INJECTION INTRAMUSCULAR; INTRAVENOUS
Status: DISCONTINUED | OUTPATIENT
Start: 2024-04-24 | End: 2024-04-24 | Stop reason: HOSPADM

## 2024-04-24 RX ORDER — SODIUM CHLORIDE 9 MG/ML
INJECTION, SOLUTION INTRAVENOUS PRN
Status: DISCONTINUED | OUTPATIENT
Start: 2024-04-24 | End: 2024-04-24 | Stop reason: HOSPADM

## 2024-04-24 RX ORDER — SUCCINYLCHOLINE/SOD CL,ISO/PF 100 MG/5ML
SYRINGE (ML) INTRAVENOUS PRN
Status: DISCONTINUED | OUTPATIENT
Start: 2024-04-24 | End: 2024-04-24 | Stop reason: SDUPTHER

## 2024-04-24 RX ORDER — LIDOCAINE HYDROCHLORIDE 20 MG/ML
INJECTION, SOLUTION EPIDURAL; INFILTRATION; INTRACAUDAL; PERINEURAL PRN
Status: DISCONTINUED | OUTPATIENT
Start: 2024-04-24 | End: 2024-04-24 | Stop reason: SDUPTHER

## 2024-04-24 RX ORDER — GLYCOPYRROLATE 0.2 MG/ML
INJECTION INTRAMUSCULAR; INTRAVENOUS PRN
Status: DISCONTINUED | OUTPATIENT
Start: 2024-04-24 | End: 2024-04-24 | Stop reason: SDUPTHER

## 2024-04-24 RX ORDER — PANTOPRAZOLE SODIUM 40 MG/1
40 TABLET, DELAYED RELEASE ORAL
Qty: 30 TABLET | Refills: 1 | Status: SHIPPED | OUTPATIENT
Start: 2024-04-24

## 2024-04-24 RX ORDER — ONDANSETRON 2 MG/ML
INJECTION INTRAMUSCULAR; INTRAVENOUS PRN
Status: DISCONTINUED | OUTPATIENT
Start: 2024-04-24 | End: 2024-04-24 | Stop reason: SDUPTHER

## 2024-04-24 RX ORDER — INDOMETHACIN 50 MG/1
SUPPOSITORY RECTAL PRN
Status: DISCONTINUED | OUTPATIENT
Start: 2024-04-24 | End: 2024-04-24 | Stop reason: ALTCHOICE

## 2024-04-24 RX ORDER — NALOXONE HYDROCHLORIDE 0.4 MG/ML
INJECTION, SOLUTION INTRAMUSCULAR; INTRAVENOUS; SUBCUTANEOUS PRN
Status: DISCONTINUED | OUTPATIENT
Start: 2024-04-24 | End: 2024-04-24 | Stop reason: HOSPADM

## 2024-04-24 RX ORDER — PHENYLEPHRINE HCL IN 0.9% NACL 1 MG/10 ML
SYRINGE (ML) INTRAVENOUS PRN
Status: DISCONTINUED | OUTPATIENT
Start: 2024-04-24 | End: 2024-04-24 | Stop reason: SDUPTHER

## 2024-04-24 RX ORDER — FENTANYL CITRATE 50 UG/ML
25 INJECTION, SOLUTION INTRAMUSCULAR; INTRAVENOUS EVERY 5 MIN PRN
Status: DISCONTINUED | OUTPATIENT
Start: 2024-04-24 | End: 2024-04-24 | Stop reason: HOSPADM

## 2024-04-24 RX ORDER — SODIUM CHLORIDE 0.9 % (FLUSH) 0.9 %
5-40 SYRINGE (ML) INJECTION EVERY 12 HOURS SCHEDULED
Status: DISCONTINUED | OUTPATIENT
Start: 2024-04-24 | End: 2024-04-24 | Stop reason: HOSPADM

## 2024-04-24 RX ORDER — DEXAMETHASONE SODIUM PHOSPHATE 10 MG/ML
INJECTION, SOLUTION INTRAMUSCULAR; INTRAVENOUS PRN
Status: DISCONTINUED | OUTPATIENT
Start: 2024-04-24 | End: 2024-04-24 | Stop reason: SDUPTHER

## 2024-04-24 RX ORDER — HYDROMORPHONE HYDROCHLORIDE 1 MG/ML
0.5 INJECTION, SOLUTION INTRAMUSCULAR; INTRAVENOUS; SUBCUTANEOUS EVERY 5 MIN PRN
Status: DISCONTINUED | OUTPATIENT
Start: 2024-04-24 | End: 2024-04-24 | Stop reason: HOSPADM

## 2024-04-24 RX ORDER — SODIUM CHLORIDE 0.9 % (FLUSH) 0.9 %
5-40 SYRINGE (ML) INJECTION PRN
Status: DISCONTINUED | OUTPATIENT
Start: 2024-04-24 | End: 2024-04-24 | Stop reason: HOSPADM

## 2024-04-24 RX ADMIN — HYDROMORPHONE HYDROCHLORIDE 0.5 MG: 1 INJECTION, SOLUTION INTRAMUSCULAR; INTRAVENOUS; SUBCUTANEOUS at 15:46

## 2024-04-24 RX ADMIN — LIDOCAINE HYDROCHLORIDE 70 MG: 20 INJECTION, SOLUTION EPIDURAL; INFILTRATION; INTRACAUDAL; PERINEURAL at 16:34

## 2024-04-24 RX ADMIN — SODIUM CHLORIDE: 9 INJECTION, SOLUTION INTRAVENOUS at 18:54

## 2024-04-24 RX ADMIN — OXYCODONE HYDROCHLORIDE 10 MG: 5 TABLET ORAL at 07:14

## 2024-04-24 RX ADMIN — OXYCODONE HYDROCHLORIDE 10 MG: 5 TABLET ORAL at 02:51

## 2024-04-24 RX ADMIN — HYDROMORPHONE HYDROCHLORIDE 0.5 MG: 1 INJECTION, SOLUTION INTRAMUSCULAR; INTRAVENOUS; SUBCUTANEOUS at 10:11

## 2024-04-24 RX ADMIN — Medication 100 MCG: at 17:30

## 2024-04-24 RX ADMIN — ONDANSETRON 4 MG: 2 INJECTION INTRAMUSCULAR; INTRAVENOUS at 16:40

## 2024-04-24 RX ADMIN — Medication 100 MCG: at 17:27

## 2024-04-24 RX ADMIN — Medication 100 MCG: at 17:49

## 2024-04-24 RX ADMIN — SODIUM CHLORIDE, PRESERVATIVE FREE 40 MG: 5 INJECTION INTRAVENOUS at 10:11

## 2024-04-24 RX ADMIN — SODIUM CHLORIDE, PRESERVATIVE FREE 40 MG: 5 INJECTION INTRAVENOUS at 20:44

## 2024-04-24 RX ADMIN — PROPOFOL 200 MG: 10 INJECTION, EMULSION INTRAVENOUS at 16:34

## 2024-04-24 RX ADMIN — Medication 100 MG: at 16:35

## 2024-04-24 RX ADMIN — DEXAMETHASONE SODIUM PHOSPHATE 10 MG: 10 INJECTION, SOLUTION INTRAMUSCULAR; INTRAVENOUS at 16:40

## 2024-04-24 RX ADMIN — OXYCODONE HYDROCHLORIDE 10 MG: 5 TABLET ORAL at 23:43

## 2024-04-24 RX ADMIN — ENOXAPARIN SODIUM 40 MG: 100 INJECTION SUBCUTANEOUS at 20:44

## 2024-04-24 RX ADMIN — GLYCOPYRROLATE 0.2 MG: 0.2 INJECTION INTRAMUSCULAR; INTRAVENOUS at 17:14

## 2024-04-24 ASSESSMENT — PAIN DESCRIPTION - ORIENTATION
ORIENTATION: UPPER;MID
ORIENTATION: MID;UPPER
ORIENTATION: UPPER;MID
ORIENTATION: MID;UPPER

## 2024-04-24 ASSESSMENT — PAIN SCALES - GENERAL
PAINLEVEL_OUTOF10: 7
PAINLEVEL_OUTOF10: 7
PAINLEVEL_OUTOF10: 3
PAINLEVEL_OUTOF10: 7
PAINLEVEL_OUTOF10: 4
PAINLEVEL_OUTOF10: 5
PAINLEVEL_OUTOF10: 7

## 2024-04-24 ASSESSMENT — PAIN DESCRIPTION - DESCRIPTORS
DESCRIPTORS: ACHING

## 2024-04-24 ASSESSMENT — PAIN - FUNCTIONAL ASSESSMENT
PAIN_FUNCTIONAL_ASSESSMENT: ACTIVITIES ARE NOT PREVENTED
PAIN_FUNCTIONAL_ASSESSMENT: ACTIVITIES ARE NOT PREVENTED
PAIN_FUNCTIONAL_ASSESSMENT: FACE, LEGS, ACTIVITY, CRY, AND CONSOLABILITY (FLACC)
PAIN_FUNCTIONAL_ASSESSMENT: ACTIVITIES ARE NOT PREVENTED
PAIN_FUNCTIONAL_ASSESSMENT: ACTIVITIES ARE NOT PREVENTED

## 2024-04-24 ASSESSMENT — PAIN DESCRIPTION - LOCATION
LOCATION: ABDOMEN

## 2024-04-24 NOTE — OP NOTE
Operative Note      Patient: Mary Gamboa  YOB: 1965  MRN: [id number]    Date of Procedure: 4/24/2024    Pre-Op Diagnosis Codes:     * Elevated LFTs [R79.89]     * Abdominal pain, unspecified abdominal location [R10.9]    Post-Op Diagnosis: Sphincterotomy, balloon sweep, sludge removal, occlusion cholangiogram       Procedure(s):  EGD WITH FORCEPS BIOPSY, ENDOSCOPIC ULTRASOUND , ERCP WITH SLUDGE REMOVAL    Surgeon(s):  Mracel Bashir MD    Assistant:   * No surgical staff found *    Anesthesia: Monitor Anesthesia Care    Estimated Blood Loss (mL): Minimal    Complications: None    Specimens:   ID Type Source Tests Collected by Time Destination   A : DUODENAL BIOPSY Tissue Duodenum SURGICAL PATHOLOGY Marcel Bashir MD 4/24/2024 1644    B : GASTRIC BIOPSY Tissue Gastric SURGICAL PATHOLOGY Marcel Bashir MD 4/24/2024 1645    C : ESOPHAGEAL NODULE Tissue Esophagus SURGICAL PATHOLOGY Marcel Bashir MD 4/24/2024 1646    D : GASTRIC POLYP Tissue Gastric SURGICAL PATHOLOGY Marcel Bashir MD 4/24/2024 1646        Implants:  * No implants in log *      Drains:   [REMOVED] NG/OG/NJ/NE Tube Orogastric 18 fr Center mouth (Removed)       Findings:  Infection Present At Time Of Surgery (PATOS) (choose all levels that have infection present):  No infection present          Description of Procedure:  Prior to the procedure, a history and physical exam was performed and informed consent was obtained. The risks were discussed including pancreatitis, bleeding, and perforation.  After the patient was placed in the prone position eved, the therapeutic duodenoscope scope was inserted into the mouth and advanced to the second portion of the duodenum allowing the papilla to be visualized.      Using the a wire guided approach with the sphincterotome, the CBD was cannulated and a cholangiogram was performed.        Findings:  The initial cholangiogram revealed mildly dilated CBD and CHD 
Operative Note      Patient: Mary Gamboa  YOB: 1965  MRN: [id number]    Date of Procedure: 4/24/2024    Pre-Op Diagnosis Codes:     * Elevated LFTs [R79.89]     * Abdominal pain, unspecified abdominal location [R10.9]    Post-Op Diagnosis:  Esophageal nodule, gastritis, gastric polyp.  Choledocholithiasis.       Procedure(s):  ENDOSCOPIC ULTRASOUND  EGD    Surgeon(s):  Marcel Bashir MD    Assistant:   * No surgical staff found *    Anesthesia: Monitor Anesthesia Care    Estimated Blood Loss (mL): Minimal    Complications: None    Specimens:   ID Type Source Tests Collected by Time Destination   A : DUODENAL BIOPSY Tissue Duodenum SURGICAL PATHOLOGY Marcel Bashir MD 4/24/2024 1644    B : GASTRIC BIOPSY Tissue Gastric SURGICAL PATHOLOGY Marcel Bashir MD 4/24/2024 1645    C : ESOPHAGEAL NODULE Tissue Esophagus SURGICAL PATHOLOGY Marcel Bashir MD 4/24/2024 1646    D : GASTRIC POLYP Tissue Gastric SURGICAL PATHOLOGY Marcel Bashir MD 4/24/2024 1646        Implants:  * No implants in log *      Drains:   [REMOVED] NG/OG/NJ/NE Tube Orogastric 18 fr Center mouth (Removed)       Findings:  Infection Present At Time Of Surgery (PATOS) (choose all levels that have infection present):  No infection present        Description of Procedure:  Informed consent was obtained from the patient after explanation of the procedure including indications, description of the procedure,  benefits and possible risks and complications of the procedure, and alternatives. Questions were answered.  The patient's history was reviewed and a directed physical examination was performed prior to the procedure.    Patient was monitored throughout the procedure with pulse oximetry and periodic assessment of vital signs. Patient was sedated as noted above. With the patient in the left lateral decubitus position, the Olympus videoendoscope followed by endoechoendoscope was placed in the patient's 
clips.  The gallbladder was removed from liver bed using sharp blunt and electrocautery dissection.  The gallbladder was removed from the abdomen the umbilicus.  The umbilical trocar was reinserted the area of the right upper quadrant was carefully inspected for hemostasis.  Sponge needle count was correct.  Hemostasis is excellent.  The end was desufflated.  The trocars removed under laparoscopic guidance.  The umbilical trocar was removed last.  The fascia here was closed using interrupted 0 Vicryl suture.  Subcutaneous tissues irrigated the skin was then closed using subcuticular suture.  Dermabond and dressings were applied.  Patient tolerated the procedure well.  The plan will be to discharge her to home if she can tolerate a diet to follow-up with both myself and her primary care doctor as an outpatient.    Electronically signed by Drew French MD on 4/22/2024 at 4:58 PM

## 2024-04-24 NOTE — ANESTHESIA PRE PROCEDURE
Department of Anesthesiology  Preprocedure Note       Name:  Mary Gamboa   Age:  59 y.o.  :  1965                                          MRN:  [id number]         Date:  2024      Surgeon: Surgeon(s):  Marcel Bashir MD    Procedure: Procedure(s):  ENDOSCOPIC ULTRASOUND  EGD    Medications prior to admission:   Prior to Admission medications    Medication Sig Start Date End Date Taking? Authorizing Provider   oxyCODONE (ROXICODONE) 5 MG immediate release tablet May take 1 tablet by mouth every 6 hours as needed for Pain. May also take 2 tablets every 6 hours as needed for Pain. Do all this for 5 days. Max Daily Amount: 60 mg. 24 Yes Pasquale Ribera DO   pantoprazole (PROTONIX) 40 MG tablet Take 1 tablet by mouth every morning (before breakfast) 24  Yes Pasquale Ribera DO   ondansetron (ZOFRAN-ODT) 4 MG disintegrating tablet Take 1 tablet by mouth every 8 hours as needed for Nausea or Vomiting 24 Yes Clare Flowers MD   Zinc Acetate, Oral, (ZINC ACETATE PO) Take 1 tablet by mouth in the morning and at bedtime   Yes Provider, Historical, MD   MAGNESIUM PO Take 1 tablet by mouth 2 times daily   Yes Provider, Historical, MD   Turmeric (QC TUMERIC COMPLEX PO) Take 1 tablet by mouth 2 times daily   Yes Provider, Historical, MD   APPLE CIDER VINEGAR PO Take 1 capsule by mouth 2 times daily   Yes Provider, Historical, MD   CINNAMON PO Take 1 tablet by mouth daily   Yes Provider, Historical, MD   Misc Natural Products (RELAX & SLEEP PO) Take 2 tablets by mouth nightly   Yes Provider, Historical, MD       Current medications:    Current Facility-Administered Medications   Medication Dose Route Frequency Provider Last Rate Last Admin    [MAR Hold] HYDROmorphone (DILAUDID) injection 0.25 mg  0.25 mg IntraVENous Q3H PRN Pasquale Ribera DO        Or    [MAR Hold] HYDROmorphone (DILAUDID) injection 0.5 mg  0.5 mg IntraVENous Q3H PRN Pasquale Ribera DO   0.5 mg at

## 2024-04-24 NOTE — ANESTHESIA POSTPROCEDURE EVALUATION
Department of Anesthesiology  Postprocedure Note    Patient: Mary Gamboa  MRN: [id number]  YOB: 1965  Date of evaluation: 4/24/2024    Procedure Summary       Date: 04/24/24 Room / Location: [address]    Anesthesia Start: 1629 Anesthesia Stop: 1810    Procedure: EGD WITH FORCEPS BIOPSY, ENDOSCOPIC ULTRASOUND , ERCP WITH SLUDGE REMOVAL (Esophagus) Diagnosis:       Elevated LFTs      Abdominal pain, unspecified abdominal location      (Elevated LFTs [R79.89])      (Abdominal pain, unspecified abdominal location [R10.9])    Surgeons: Marcel Bashir MD Responsible Provider: Katlyn Katz MD    Anesthesia Type: general ASA Status: 3            Anesthesia Type: No value filed.    Karma Phase I: Karma Score: 9    Karma Phase II:      Anesthesia Post Evaluation    Patient location during evaluation: PACU  Patient participation: complete - patient participated  Level of consciousness: awake  Airway patency: patent  Nausea & Vomiting: no nausea  Cardiovascular status: blood pressure returned to baseline  Respiratory status: acceptable  Hydration status: euvolemic  Comments: Multimodal analgesia pain management as indicated by procedure  Multimodal analgesia pain management approach  Pain management: adequate    No notable events documented.

## 2024-04-24 NOTE — CARE COORDINATION
[facility] Quality Flow/Interdisciplinary Rounds Progress Note    Quality Flow Rounds held on April 24, 2024 at 0930    Disciplines Attending:  Bedside Nurse, , , and Nursing Unit Leadership    Barriers to Discharge: clinical status    Anticipated Discharge Date:   4/25/24    Anticipated Discharge Disposition: Home independent    Readmission Risk              Risk of Unplanned Readmission:  11           Discussed patient goal for the day, patient clinical progression, and barriers to discharge. POD 2 lap ester. Plan for EGD/EUS today at 3pm.    Caty Ford RN  April 24, 2024

## 2024-04-24 NOTE — PLAN OF CARE
Pt has been resting in bed for most of the day. Pt still complains of pain to the Mid Upper GI region. GI cocktail was ordered, and did help alleviate pain. Norco switched to Manda for better pain management. Pt is to Be NPO for EGD tomorrow. Pt is ACHS checks with no coverage needed. Incision sites covered with surgical glue w/ no drainage. The site at the umbilicus appears to be a bit red. All questions and concerns have been addressed. Bed is locked and in the lowest position with the call light in reach. Safety maintained.      Problem: Discharge Planning  Goal: Discharge to home or other facility with appropriate resources  Outcome: Progressing  Flowsheets (Taken 4/23/2024 0800)  Discharge to home or other facility with appropriate resources: Identify barriers to discharge with patient and caregiver     Problem: Pain  Goal: Verbalizes/displays adequate comfort level or baseline comfort level  Outcome: Progressing     Problem: Gastrointestinal - Adult  Goal: Minimal or absence of nausea and vomiting  Outcome: Progressing  Flowsheets (Taken 4/23/2024 0800)  Minimal or absence of nausea and vomiting: Administer IV fluids as ordered to ensure adequate hydration     Problem: Safety - Adult  Goal: Free from fall injury  Outcome: Progressing

## 2024-04-25 VITALS
OXYGEN SATURATION: 100 % | HEART RATE: 57 BPM | RESPIRATION RATE: 16 BRPM | SYSTOLIC BLOOD PRESSURE: 149 MMHG | WEIGHT: 145 LBS | DIASTOLIC BLOOD PRESSURE: 77 MMHG | TEMPERATURE: 97.7 F | HEIGHT: 61 IN | BODY MASS INDEX: 27.38 KG/M2

## 2024-04-25 LAB
ALBUMIN SERPL-MCNC: 3.5 G/DL (ref 3.5–5.2)
ALP SERPL-CCNC: 168 U/L (ref 35–104)
ALT SERPL-CCNC: 208 U/L (ref 5–33)
ANION GAP SERPL CALCULATED.3IONS-SCNC: 9 MMOL/L (ref 9–17)
AST SERPL-CCNC: 80 U/L
BASOPHILS # BLD: <0.03 K/UL (ref 0–0.2)
BASOPHILS NFR BLD: 0 % (ref 0–2)
BILIRUB DIRECT SERPL-MCNC: <0.1 MG/DL
BILIRUB SERPL-MCNC: 0.4 MG/DL (ref 0.3–1.2)
BUN SERPL-MCNC: 10 MG/DL (ref 6–20)
BUN/CREAT SERPL: ABNORMAL (ref 9–20)
CALCIUM SERPL-MCNC: 8.6 MG/DL (ref 8.6–10.4)
CHLORIDE SERPL-SCNC: 108 MMOL/L (ref 98–107)
CO2 SERPL-SCNC: 22 MMOL/L (ref 20–31)
CREAT SERPL-MCNC: <0.4 MG/DL (ref 0.5–0.9)
EOSINOPHIL # BLD: <0.03 K/UL (ref 0–0.44)
EOSINOPHILS RELATIVE PERCENT: 0 % (ref 1–4)
ERYTHROCYTE [DISTWIDTH] IN BLOOD BY AUTOMATED COUNT: 13.2 % (ref 11.8–14.4)
GFR SERPL CREATININE-BSD FRML MDRD: ABNORMAL ML/MIN/1.73M2
GLUCOSE BLD-MCNC: 101 MG/DL (ref 65–105)
GLUCOSE BLD-MCNC: 125 MG/DL (ref 65–105)
GLUCOSE SERPL-MCNC: 86 MG/DL (ref 70–99)
HCT VFR BLD AUTO: 30.7 % (ref 36.3–47.1)
HGB BLD-MCNC: 10 G/DL (ref 11.9–15.1)
IMM GRANULOCYTES # BLD AUTO: 0.03 K/UL (ref 0–0.3)
IMM GRANULOCYTES NFR BLD: 1 %
LYMPHOCYTES NFR BLD: 1.51 K/UL (ref 1.1–3.7)
LYMPHOCYTES RELATIVE PERCENT: 25 % (ref 24–43)
MCH RBC QN AUTO: 28.6 PG (ref 25.2–33.5)
MCHC RBC AUTO-ENTMCNC: 32.6 G/DL (ref 28.4–34.8)
MCV RBC AUTO: 87.7 FL (ref 82.6–102.9)
MONOCYTES NFR BLD: 0.71 K/UL (ref 0.1–1.2)
MONOCYTES NFR BLD: 12 % (ref 3–12)
NEUTROPHILS NFR BLD: 62 % (ref 36–65)
NEUTS SEG NFR BLD: 3.69 K/UL (ref 1.5–8.1)
NRBC BLD-RTO: 0 PER 100 WBC
PLATELET # BLD AUTO: 250 K/UL (ref 138–453)
PMV BLD AUTO: 10.3 FL (ref 8.1–13.5)
POTASSIUM SERPL-SCNC: 3.8 MMOL/L (ref 3.7–5.3)
PROT SERPL-MCNC: 6.2 G/DL (ref 6.4–8.3)
RBC # BLD AUTO: 3.5 M/UL (ref 3.95–5.11)
SODIUM SERPL-SCNC: 139 MMOL/L (ref 135–144)
WBC OTHER # BLD: 6 K/UL (ref 3.5–11.3)

## 2024-04-25 PROCEDURE — 36415 COLL VENOUS BLD VENIPUNCTURE: CPT

## 2024-04-25 PROCEDURE — 80053 COMPREHEN METABOLIC PANEL: CPT

## 2024-04-25 PROCEDURE — 85025 COMPLETE CBC W/AUTO DIFF WBC: CPT

## 2024-04-25 PROCEDURE — 82248 BILIRUBIN DIRECT: CPT

## 2024-04-25 PROCEDURE — G0378 HOSPITAL OBSERVATION PER HR: HCPCS

## 2024-04-25 PROCEDURE — 2580000003 HC RX 258: Performed by: INTERNAL MEDICINE

## 2024-04-25 PROCEDURE — 6370000000 HC RX 637 (ALT 250 FOR IP): Performed by: INTERNAL MEDICINE

## 2024-04-25 PROCEDURE — 99232 SBSQ HOSP IP/OBS MODERATE 35: CPT | Performed by: INTERNAL MEDICINE

## 2024-04-25 PROCEDURE — 82947 ASSAY GLUCOSE BLOOD QUANT: CPT

## 2024-04-25 RX ORDER — ACETAMINOPHEN 325 MG/1
650 TABLET ORAL EVERY 4 HOURS PRN
Status: DISCONTINUED | OUTPATIENT
Start: 2024-04-25 | End: 2024-04-25 | Stop reason: HOSPADM

## 2024-04-25 RX ADMIN — ACETAMINOPHEN 650 MG: 325 TABLET ORAL at 08:23

## 2024-04-25 RX ADMIN — Medication 10 MG: at 08:23

## 2024-04-25 RX ADMIN — SODIUM CHLORIDE: 9 INJECTION, SOLUTION INTRAVENOUS at 08:25

## 2024-04-25 NOTE — CARE COORDINATION
[facility] Quality Flow/Interdisciplinary Rounds Progress Note    Quality Flow Rounds held on April 25, 2024 at 0930    Disciplines Attending:  Bedside Nurse, , , and Nursing Unit Leadership    Barriers to Discharge: none    Anticipated Discharge Date:   4/25/24    Anticipated Discharge Disposition: Home    Readmission Risk              Risk of Unplanned Readmission:  10           Discussed patient goal for the day, patient clinical progression, and barriers to discharge. Patient s/p lap ester 4/22 and ERCP 4/24. Plan to return home independent at discharge. No needs.    Caty Ford RN  April 25, 2024

## 2024-04-25 NOTE — DISCHARGE SUMMARY
[facility]  Office: [phone]  Derrick Richards DO, Pedro Guajardo DO, Pasquale Ribera DO, Tuan Gómez DO, Aarti Bang MD, Bree Gonzalez MD, Bhupendra Up MD, Fabi Gaming MD,  Mulugeta Harden MD, Mary Kate Quijano MD, Joana Mckeon MD,  Chelle Pascual DO, Izabela Roldan MD, Jerry Ramires MD, Dontrell Richards DO, Marilee Ramsay MD,  Lex Shah DO, Kavitha Newell MD, Katja Alvarado MD, Citlaly Dye MD, Leo Ye MD,  Norris Paige MD, Yobani Joaquin MD, Tessy Green MD, Janey Radford MD, Efrain Tuttle MD, Divya Suh MD, Sonny Stewart DO, Js Steen DO, Bart Gay MD,  Lonny Rincon MD, Shirley Waterhouse, CNP,  Geetha Carrillo CNP, Sen Blanco, CNP,  Deneen Aaron, DNP, Edwige Cota, CNP, Emilie Muniz, CNP, Dariela Delcid, CNP, Wen Schuster, CNP, Lety Kay, PA-C, Franchesca Lynn PA-C, Marlen Anderson, CNP, Mary Trujillo, CNP, Elisabeth Coburn, CNP, Mechelle Centeno, CNP, Tania Aly, CNP, Talia Carrion, CNS, Farhana Mathews, CNP, Alma Hansen CNP, Tracy Schwab, CNP         [facility]   IN-PATIENT SERVICE   [facility]    Discharge Summary     Patient ID: Mary Gamboa  :  1965   MRN: [id number]     ACCOUNT:  [id number]   Patient's PCP: Magno Hurtado MD  Admit Date: 2024   Discharge Date: 2024     Length of Stay: 8  Code Status:  Full Code  Admitting Physician: Pasquale Ribera DO  Discharge Physician: Pasquale J Orlop, DO     Active Discharge Diagnoses:     Hospital Problem Lists:  Principal Problem:    Biliary dyskinesia  Active Problems:    Right upper quadrant abdominal pain    Elevated liver enzymes    Overweight    Nausea and vomiting    Biliary colic  Resolved Problems:    * No resolved hospital problems. *      Admission Condition:  fair     Discharged Condition: stable    Hospital Stay:     Hospital Course:  Mary Gamboa is a 59 y.o. female who was admitted for the management of  Biliary

## 2024-04-25 NOTE — PLAN OF CARE
Problem: Discharge Planning  Goal: Discharge to home or other facility with appropriate resources  Outcome: Adequate for Discharge     Problem: Pain  Goal: Verbalizes/displays adequate comfort level or baseline comfort level  Outcome: Adequate for Discharge     Problem: Gastrointestinal - Adult  Goal: Minimal or absence of nausea and vomiting  Outcome: Adequate for Discharge     Problem: Safety - Adult  Goal: Free from fall injury  Outcome: Adequate for Discharge     Problem: ABCDS Injury Assessment  Goal: Absence of physical injury  Outcome: Adequate for Discharge

## 2024-04-25 NOTE — PROGRESS NOTES
Gastroenterology Progress Note      Patient:   Mary Gamboa   :    1965   Facility:   [facility]   Date:     2024  Consultant:   CECILY PETERSON      Subjective:     Patient seen and examined.  at bedside. Dr. Ribera at bedside as well.   S/p ester with IOC yesterday no filling defects, pain is not improved post surgery. Patient dd eat a small amount breakfast and small amt lunch today. Pain epigastrium, similar to prior to cholecystectomy, radiation into back.   Labs reviewed.     Objective:   Vital Signs:  /68   Pulse 59   Temp 97.7 °F (36.5 °C) (Oral)   Resp 16   Ht 1.549 m (5' 1\")   Wt 68 kg (150 lb)   SpO2 98%   BMI 28.34 kg/m²      Physical Exam:   General appearance: Alert, NAD  Lungs: CTA bilaterally    Heart:S1S2 RRR  Abdomen: some ttp incisional, ttp epigastrium no peritoneal signs, hypoactive bs  Skin:  No jaundice, no stigmata of chronic liver disease.    Lab and Imaging Review   CBC:   Lab Results   Component Value Date/Time    WBC 4.0 2024 05:39 AM    RBC 3.40 2024 05:39 AM    HGB 9.8 2024 05:39 AM    HCT 30.7 2024 05:39 AM    MCV 90.3 2024 05:39 AM    MCH 28.8 2024 05:39 AM    MCHC 31.9 2024 05:39 AM    RDW 13.6 2024 05:39 AM     2024 05:39 AM    MPV 9.5 2024 05:39 AM     CBC with Differential:    Lab Results   Component Value Date/Time    WBC 4.0 2024 05:39 AM    RBC 3.40 2024 05:39 AM    HGB 9.8 2024 05:39 AM    HCT 30.7 2024 05:39 AM     2024 05:39 AM    MCV 90.3 2024 05:39 AM    MCH 28.8 2024 05:39 AM    MCHC 31.9 2024 05:39 AM    RDW 13.6 2024 05:39 AM    LYMPHOPCT 37 2024 05:39 AM    MONOPCT 13 2024 05:39 AM    BASOPCT 0 2024 05:39 AM    MONOSABS 0.53 2024 05:39 AM    LYMPHSABS 1.45 2024 05:39 AM    EOSABS 0.08 2024 05:39 AM    BASOSABS <0.03 2024 05:39 AM     Platelets: 
CLINICAL PHARMACY NOTE: MEDS TO BEDS    Total # of Prescriptions Filled: 0   The following medications were delivered to the patient:  .    Additional Documentation:    Pt not liking norco 5-325.  I retrieved the norco and will await the new pain med tomorrow  
CLINICAL PHARMACY NOTE: MEDS TO BEDS    Total # of Prescriptions Filled: 2   The following medications were delivered to the patient:  Norco 5-325  Ondansetron 4mg    Additional Documentation:  
CLINICAL PHARMACY NOTE: MEDS TO BEDS    Total # of Prescriptions Filled: 2   The following medications were delivered to the patient:  Oxycodone 5mg  Pantoprazole 40mg    Additional Documentation:  
Interventions for both pharmaceutical  and non-pharmaceutical:   -Pharmaceutical: miguel    -Non-pharmaceutical: rest, repositioning, emotional support, ambulation, distraction, family support   Pain Goals: 2  Symptom Management after discharge:   miguel, Rest     
[facility]  Office: [phone]  Derrick Richards DO, Pedro Guajardo DO, Pasquale Ribera DO, Tuan Gómez DO, Aarti Bang MD, Bree Gonzalez MD, Bhupendra Up MD, Fabi Gaming MD,  Mulugeta Harden MD, Mary Kate Quijano MD, Joana Mckeon MD,  Chelle Pascual DO, Izabela Roldan MD, Jerry Ramires MD, Dontrell Richards DO, Marilee Ramsay MD,  Lex Shah DO, Kavitha Newell MD, Katja Alvarado MD, Citlaly Dye MD, Leo Ye MD,  Norris Paige MD, Yobani Joaquin MD, Tessy Green MD, Janey Radford MD, Efrain Tuttle MD, Divya Suh MD, Sonny Stewart DO, Js Steen DO, Bart Gay MD,  Lonny Rincon MD, Shirley Waterhouse, CNP,  Geetha Carrillo CNP, Sen Blanco, CNP,  Deneen Aaron, DNP, Edwige Cota, CNP, Emilie Muniz, CNP, Dariela Delcid CNP, Wen Schuster, CNP, Lety Kay, PA-C, Franchesca Lynn PA-C, Marlen Anderson, CNP, Mary Trujillo, CNP, Elisabeth Coburn, CNP, Mechelle Centeno, CNP, Tania Aly, CNP, Talia Carrion, CNS, Farhana Mathews, CNP, Alma Hansen CNP, Tracy Schwab, CNP         [facility]   IN-PATIENT SERVICE   [facility]    Progress Note    4/22/2024    12:57 PM    Name:   Mary Gamboa  MRN:     [id number]     Acct:      [id number]   Room:   2005/2005-01  IP Day:  5  Admit Date:  4/17/2024  6:15 PM    PCP:   No primary care provider on file.  Code Status:  Full Code    Subjective:     C/C: No chief complaint on file.    Interval History Status: improved.     Patient seen and examined leg slightly flexed towards the right.  States that this is the least uncomfortable position for her.  No other acute issues overnight.  Patient slightly anxious for surgery but wants to get all over with.  States that she feels much better than when she did on Friday.    Brief History:     59-year-old female no significant past medical history presents with right upper quadrant pain found to have biliary colic with chronic cholecystitis.  Plan for 
[facility]  Office: [phone]  Derrick Richards DO, Pedro Guajardo, DO, Pasquale Ribera DO, Tuan Gómez, DO, Aarti Bang MD, Bree Gonzalez MD, Bhupnedra Up MD, Fabi Gaming MD,  Mulugeta Harden MD, Mary Kate Quijano MD, Joana Mckeon MD,  Chelle Pascual DO, Izabela Roldan MD, Jerry Ramires MD, Dontrell Richards DO, Marilee Ramsay MD,  Lex Shah DO, Kavitha Newell MD, Katja Alvarado MD, Citlaly Dye MD, Leo Ye MD,  Norris Paige MD, Yobani Joaquin MD, Tessy Green MD, Janey Radford MD, Efrain Tuttle MD, Divya Suh MD, Sonny Stewart DO, Js Steen DO, Bart Gay MD,  Lonny Rincon MD, Shirley Waterhouse, CNP,  Geetha Carrillo CNP, Sen Blanco, CNP,  Deneen Aaron, DNP, Edwige Cota, CNP, Emilie Muniz, CNP, Dariela Delcid CNP, Wen Schuster, CNP, Lety Kay, PA-C, Franchesca Lynn PA-C, Marlen Anderson, CNP, Mary Trujillo, CNP, Elisabeth Coburn, CNP, Mechelle Centeno, CNP, Tania Aly, CNP, Talia Carrion, CNS, Farhana Mathews, CNP, Alma Hansen CNP, Tracy Schwab, CNP         [facility]   IN-PATIENT SERVICE   [facility]    Progress Note    4/24/2024    8:16 AM    Name:   Mary Gamboa  MRN:     [id number]     Acct:      [id number]   Room:   2005/2005-01  IP Day:  7  Admit Date:  4/17/2024  6:15 PM    PCP:   No primary care provider on file.  Code Status:  Full Code    Subjective:     C/C: Abdominal pain    Interval History Status: not changed.     Patient is resting in bed, had some minimal relief with GI cocktail yesterday.  Denies any chest pain, shortness of breath, nausea or vomiting, fevers or chills.  Continued epigastric pain, scheduled for EGD with possible EUS today    Brief History:     Per prior documentation  \"This is a 59-year-old female who presented to the hospital for evaluation right upper quadrant abdominal pain and epigastric pain for 3 days with associated nausea and vomiting. MRI of her abdomen showed trace 
Mary Gamboa is a 59 y.o. female patient.  She is here with right upper quadrant pain due to chronic cholecystitis and probable biliary colic.    Current Facility-Administered Medications   Medication Dose Route Frequency Provider Last Rate Last Admin    0.9 % sodium chloride infusion   IntraVENous Continuous Chelle Pascual, DO 75 mL/hr at 04/22/24 0602 New Bag at 04/22/24 0602    insulin lispro (HUMALOG) injection vial 0-4 Units  0-4 Units SubCUTAneous 4x Daily AC & HS Chelle Pascual I, DO        pantoprazole (PROTONIX) 40 mg in sodium chloride (PF) 0.9 % 10 mL injection  40 mg IntraVENous Q12H Chelle Pascual, DO   40 mg at 04/22/24 0807    butalbital-acetaminophen-caffeine (FIORICET, ESGIC) per tablet 1 tablet  1 tablet Oral Q4H PRN Pasquale Ribera DO   1 tablet at 04/21/24 1534    HYDROmorphone HCl PF (DILAUDID) injection 1 mg  1 mg IntraVENous Q2H PRN Drew French MD   1 mg at 04/22/24 0606    Or    HYDROmorphone HCl PF (DILAUDID) injection 2 mg  2 mg IntraVENous Q2H PRN Drew French MD        glucose chewable tablet 16 g  4 tablet Oral PRN Geetha Carrillo, APRN - CNP        dextrose bolus 10% 125 mL  125 mL IntraVENous PRN Geetha Carrillo, APRN - CNP        Or    dextrose bolus 10% 250 mL  250 mL IntraVENous PRN Geetha Carrillo, APRN - CNP        glucagon injection 1 mg  1 mg SubCUTAneous PRN Geetha Carrillo, APRN - CNP        dextrose 10 % infusion   IntraVENous Continuous PRN Geetha Carrillo, APRN - CNP        sodium chloride flush 0.9 % injection 5-40 mL  5-40 mL IntraVENous 2 times per day Elisabeth Coburn APRN - CNP   10 mL at 04/19/24 0901    sodium chloride flush 0.9 % injection 10 mL  10 mL IntraVENous PRN Elisabeth Coburn, ADALBERTO - CNP        0.9 % sodium chloride infusion   IntraVENous PRN Elisabeth Coburn, APRN - CNP        potassium chloride (KLOR-CON M) extended release tablet 40 mEq  40 mEq Oral PRN Elisabeth Coburn APRN - TJ        Or    potassium 
Occupational Therapy    DATE: 2024    NAME: Mary Gamboa  MRN: [id number]   : 1965    Patient not seen this date for Occupational Therapy due to:      [] Cancel by RN or physician due to:    [] Hemodialysis    [] Critical Lab Value Level     [] Blood transfusion in progress    [] Acute or unstable cardiovascular status   _MAP < 55 or more than >115  _HR < 40 or > 130    [] Acute or unstable pulmonary status   -FiO2 > 60%   _RR < 5 or >40    _O2 sats < 85%    [] Strict Bedrest    [] Off Unit for surgery or procedure    [] Off Unit for testing       [] Pending imaging to R/O fracture    [] Refusal by Patient      [] Intubated    [] Other      [x] OT being discontinued at this time. Patient independent. No further needs. - Per RN Veronica, pt is IND. Writer spoke with pt, pt reports she has be IND in her room and declines all therapy needs at this time. Please re-order if functional status changes.      [] OT being discontinued at this time as the patient has been transferred to hospice care. No further needs.    Leanne Trejo OTR/L    
Patient felt well yesterday until diet and then had recurrence of epigastric pain that she was having prior to surgery.  Discussed with patient at surgery she had a cholangiogram with her cholecystectomy and this showed no filling defects and good flow into her duodenum.  She has however scheduled for EGD and possible ERCP today to rule out any retained small stones and also to rule out any ulcers or hiatal hernias the patient does use a fair amount of NSAIDs per the primary service.    Otherwise vital signs unremarkable  Abdomen soft and with mild incisional discomfort and all incision sites clean and dry.    Awaiting results of endoscopic ultrasound and ERCP later today.  If this is unremarkable hopefully could be discharged shortly  
Patient off unit to preop. Handoff given to CorI RN and bedside  
Patient only had a 1/2-cup of chicken broth. Not eating and drinking much. Writer had to medicate her one time for pain with IV dilaudid and pepcid for heartburn. Patient felt much better after these med administration and slept. However, she is still very hesitant to eat or drink fluids even though she asks for it. Patient is independent, alert and oriented x 4. No emesis reported. Patient had shower during the shift. IV Zosyn was discontinued and IV protonix bid was started for the patient. IV fluid was switched to 0.9% NaCl running at 75 ml/hr. Night nurse updated. Will continue to monitor the patient.  
Patient postop day #1 after laparoscopic cholecystectomy and unremarkable cholangiogram.  Was having some pain issues last night for which I was called but feeling considerably better today.  States has mild discomfort at incision sites but still some epigastric discomfort as well.  Previous nausea now resolved and did tolerate clear liquids and crackers last night and to have regular diet for breakfast this morning.    Vital signs unremarkable.    On examination all incision sites clean and dry with mild epigastric and right upper quadrant discomfort.  Moderate lung excursion.    Impression/recommendation  Most the patient's epigastric and right upper quadrant discomfort resolved after cholecystectomy.  Discussed with patient she should still likely continue on her proton pump inhibitor as well she still having some epigastric discomfort.  Okay for discharge to home today after regular breakfast.  Discussed with patient to expect not great appetite for couple days and also very common not to have a bowel movement for couple of days.  She may shower as of tomorrow  She is to not do any heavy lifting for about 4 weeks.  She is to follow-up with Dr. French in our office in [place] in about 1-1/2 to 2 weeks and may call in the meantime for any new interval changes.          
Patient returned to room from PACU  
Patient still in lot of discomfort. Tolerating saltine crackers but fluid wise intake is very low. Patient was medicated for pain in her abdomen and headache twice and also for nausea during the shift. Patient is tentatively scheduled for cholecystectomy tomorrow at 3:30 p.m. per Dr French. Patient and patient's family aware of it. Will continue to monitor the patient.  
Physical Therapy  DATE: 2024    NAME: Mary Gamboa  MRN: [id number]   : 1965    Patient not seen this date for Physical Therapy due to:      [] Cancel by RN or physician due to:    [] Hemodialysis    [] Critical Lab Value Level     [] Blood transfusion in progress    [] Acute or unstable cardiovascular status   _MAP < 55 or more than >115  _HR < 40 or > 130    [] Acute or unstable pulmonary status   -FiO2 > 60%   _RR < 5 or >40    _O2 sats < 85%    [] Strict Bedrest    [] Off Unit for surgery or procedure    [] Off Unit for testing       [] Pending imaging to R/O fracture    [] Refusal by Patient      [] Other      [x] PT being discontinued at this time. Patient independent. No further needs.     [] PT being discontinued at this time as the patient has been transferred to hospice care. No further needs.      Cami nIgram, PT     
Pt NPO status for procedure.    C/O squeezing abdominal pain which radiates to back.  Prn 1mg Dilaudid effective for pain.     
Pt discharged to home in good condition with belongings  Discharge instructions given  \"Meds To Beds\" medication at bedside  Pt denies having any further questions at this time  Locked up home medication(s)/personal items given to patient at discharge  Patient/family state they have everything they were admitted with.      
Pt returned to room from PACU  Oriented to room and call light/tv controls.  Bed in lowest position, wheels locked, 2/4 side rails up  Call light in reach, room free of clutter, adequate lighting provided.   
[facility]  Office: [phone]  Derrick Richards DO, Pedro Guajardo DO, Pasquale Ribera DO, Tuan Gómez DO, Aarti Bang MD, Bree Gonzalez MD, Bhupendra Up MD, Fabi Gaming MD,  Mulugeta Harden MD, Mary Kate Quijano MD, Joana Mckeon MD,  Chelle Pascual DO, Izabela Roldan MD, Jerry Ramires MD, Dontrell Richards DO, Marilee Ramsay MD,  Lex Shah DO, Kavitha Newell MD, Katja Alvarado MD, Citlaly Dye MD, Leo Ye MD,  Norris Paige MD, Yobani Joaquin MD, Tessy Green MD, Janey Radford MD, Efrain Tuttle MD, Divya Suh MD, Sonny Stewart DO, Js Steen DO, Bart Gay MD,  Lonny Rincon MD, Shirley Waterhouse, CNP,  Geetha Carrillo CNP, Sen Blanco, CNP,  Deneen Aaron, DNP, Edwige Cota, CNP, Emilie Muniz, CNP, Dariela Delcid, CNP, Wen Schuster, CNP, Lety Kay, PA-C, Franchesca Lynn PA-C, Marlen Anderson, CNP, Mary Trujillo, CNP, Elisabeth Coburn, CNP, Mechelle Centeno, CNP, Tania Aly, CNP, Talia Carrion, CNS, Farhana Mathews, CNP, Alma Hansen CNP, Tracy Schwab, CNP         [facility]   IN-PATIENT SERVICE   [facility]    Progress Note    4/18/2024    9:52 AM    Name:   Mary Gamboa  MRN:     [id number]     Acct:      [id number]   Room:   2005/2005-01  IP Day:  1  Admit Date:  4/17/2024  6:15 PM    PCP:   No primary care provider on file.  Code Status:  Full Code    Subjective:     C/C: Epigastric pain with nausea, chills and shortness of breath    Interval History Status: improved.      and AST are 202 today lipase is 46  She states her abdominal pain and nausea have been relieved with prn medications but she currently has a headache.  She states she started having a headache overnight that is just behind her eyes.  She was given Tylenol per nursing.  Tylenol discontinued related to elevated ALT and AST complained give a one-time dose of Toradol    Brief History:     Per previous documentation  Mary Gamboa is a 59 
[facility]  Office: [phone]  Derrick Richards DO, Pedro Guajardo, DO, Pasquale Ribera DO, Tuan Gómez DO, Aarti Bang MD, Bree Gonzalez MD, Bhupendra Up MD, Fabi Gaming MD,  Mulugeta Harden MD, Mary Kate Quijano MD, Joana Mckeon MD,  Chelle Pascual DO, Izabela Roldan MD, Jerry Ramires MD, Dontrell Richards DO, Marilee Ramsay MD,  Lex Shah DO, Kavitha Newell MD, Katja Alvarado MD, Citlaly Dye MD, Leo Ye MD,  Norris Paige MD, Yobani Joaquin MD, Tessy Green MD, Janey Radford MD, Efrain Tuttle MD, Divya Suh MD, Sonny Stewart DO, Js Steen DO, Bart Gay MD,  Lonny Rincon MD, Shirley Waterhouse, CNP,  Geetha Carrillo CNP, Sen Blanco, CNP,  Deneen Aaron, MARGAUX, Edwige Cota, CNP, Emilie Muniz, CNP, Dariela Delcid CNP, Wen Schuster, CNP, Lety Kay, PA-C, Franchesca Lynn PA-C, Marlen Anderson, CNP, Mary Trujillo, CNP, Elisabeth Coburn, CNP, Mechelle Centeno, CNP, Tania Aly, CNP, Talia Carrion, CNS, Farhaan Mathews, CNP, Alma Hansen CNP, Tracy Schwab, CNP         [facility]   IN-PATIENT SERVICE   [facility]    Progress Note    4/19/2024    7:20 AM    Name:   Mary Gamboa  MRN:     [id number]     Acct:      [id number]   Room:   2005/2005-01  IP Day:  2  Admit Date:  4/17/2024  6:15 PM    PCP:   No primary care provider on file.  Code Status:  Full Code    Subjective:     C/C: Abdominal pain with nausea and vomiting    Interval History Status: Not feeling as well this morning    Hida scan today  Liver enzymes remain elevated but they are stable   She continues to complain of intermittent headache and this point she has some nausea.  She had a dose of fentanyl around 6 AM but unfortunately with her HIDA scan she cannot have any pain medicine at this time but will repeat her dose of Zofran and order a scopolamine patch.  Plan discussed with RN    Brief History:     Per previous documentation  Mary Gamboa is a 59 
[facility]  Office: [phone]  Derrick Richards DO, Pedro Guajardo DO, Pasquale Ribera DO, Tuan Gómez DO, Aarti Bang MD, Bree Gonzalez MD, Bhupendra Up MD, Fabi Gaming MD,  Mulugeta Harden MD, Mary Kate Quijano MD, Joana Mckeon MD,  Chelle Pascual DO, Izabela Roldan MD, Jerry Ramires MD, Dontrell Richards DO, Marilee Ramsay MD,  Lex Shah DO, Kavitha Newell MD, Katja Alvarado MD, Citlaly Dye MD, Leo Ye MD,  Norris Paige MD, Yobani Joaquin MD, Tessy Green MD, Janey Radford MD, Efrain Tuttle MD, Divya Suh MD, Sonny Stewart DO, Js Steen DO, Bart Gay MD,  Lonny Rincon MD, Shirley Waterhouse, CNP,  Geetha Carrillo CNP, Sen Blanco, CNP,  Deneen Aaron, DNP, Edwige Cota, CNP, Emilie Muniz, CNP, Dariela Delcid, CNP, Wen Schuster, CNP, Lety Kay, PA-C, Franchesca Lynn PA-C, Marlen Anderson, CNP, Mary Trujillo, CNP, Elisabeth Coburn, CNP, Mechelle Centeno, CNP, Tania Aly, CNP, Talia Carrion, CNS, Farhana Mathews, CNP, Alma Hansen CNP, Tracy Schwab, CNP         [facility]   IN-PATIENT SERVICE   [facility]    Progress Note    4/23/2024    2:40 PM    Name:   Mary Gamboa  MRN:     [id number]     Acct:      [id number]   Room:   2005/2005-01  IP Day:  6  Admit Date:  4/17/2024  6:15 PM    PCP:   No primary care provider on file.  Code Status:  Full Code    Subjective:     C/C: Abdominal pain    Interval History Status: not changed.     Patient with continued epigastric pain, unchanged from presentation.  Denies any shortness of breath, nausea vomiting, fevers chills or acute complaints    Brief History:     Per prior documentation  \"This is a 59-year-old female who presented to the hospital for evaluation right upper quadrant abdominal pain and epigastric pain for 3 days with associated nausea and vomiting. MRI of her abdomen showed trace pericholecystic fluid without wall thickening or gallstone but was equivocal 
[facility]  Office: [phone]  Derrick Richards DO, Pedro Guajardo DO, Pasquale Ribera DO, Tuan Gómez DO, Aarti Bang MD, Bree Gonzalez MD, Bhupendra Up MD, Fabi Gaming MD,  Mulugeta Harden MD, Mary Kate Quijano MD, Joana Mckeon MD,  Chelle Pascual DO, Izabela Roldan MD, Jerry Ramires MD, Dontrell Richards DO, Marilee Ramsay MD,  Lex Shah DO, Kavitha Newell MD, Katja Alvarado MD, Citlaly Dye MD, Leo Ye MD,  Norris Paige MD, Yobani Joaquin MD, Tessy Green MD, Janey Radford MD, Efrain Tuttle MD, Divya Suh MD, Sonny Stewart DO, Js Steen DO, Bart Gay MD,  Lonny Rincon MD, Shirley Waterhouse, CNP,  Geetha Carrillo CNP, Sen Blanco, CNP,  Deneen Aaron, MARGAUX, Edwige Cota, CNP, Emilie Muniz, CNP, Dariela Delcid CNP, Wen Schuster, CNP, Lety Kay, PA-C, Franchesca Lynn PA-C, Marlen Anderson, CNP, Mary Trujillo, CNP, Elisabeth Coburn, CNP, Mechelle Centeno, CNP, Tania Aly, CNP, Talia Carrion, CNS, Farhana Mathews, CNP, Alma Hansen CNP, Tracy Schwab, CNP         [facility]   IN-PATIENT SERVICE   [facility]    Progress Note    4/25/2024    9:03 AM    Name:   Mary Gamboa  MRN:     [id number]     Acct:      [id number]   Room:   2005/2005-01  IP Day:  8  Admit Date:  4/17/2024  6:15 PM    PCP:   No primary care provider on file.  Code Status:  Full Code    Subjective:     C/C: Abdominal pain    Interval History Status: Significantly improved.     Patient underwent ERCP yesterday with removal of sludge and tiny stones with marked improvement in her pain.  She denies any chest pain, shortness of breath, nausea or vomiting, fevers or chills.  Tolerating liquid diet this morning    Brief History:     Per prior documentation  \"This is a 59-year-old female who presented to the hospital for evaluation right upper quadrant abdominal pain and epigastric pain for 3 days with associated nausea and vomiting. MRI of her abdomen 
Subjective    Feels like a new woman after her ERCP and sphincterotomy and removal of sludge from her bile duct.    Objective    Alert and oriented  Nonicteric  Chest symmetric excursion  Abdomen incisions clean dry and intact and no epigastric tenderness today  Extremities full range of motion x 4    Assessment and plan    Status post laparoscopic cholecystectomy and subsequent ERCP with sphincterotomy and removal of sludge from bile duct.  She feels like a new woman.  She feels quite well.  If she tolerates a diet could be discharged home.  Could follow-up with me in my [facility] office.      
Subjective    She still has epigastric pain and nausea.  She is able to keep some liquids down.    Objective    Alert and appropriate  Nonicteric  Chest symmetric excursion  Abdomen sore in the epigastrium without any peritoneal signs  Extremities full range of motion x 4    Assessment and plan    Probable biliary colic.  She could have passed 1 stone.  Will treat her empirically for peptic ulcer disease with Protonix.  Okay to discharge if can drink 8 ounces of water in 8 hours.  To return on Monday for a outpatient cholecystectomy at 3:30 PM.  She would need to be here at least by 130.  
Subjective    States that she did get sick after a strawberry Ensure last night.  She symptomatically could be suffering from biliary colic.  She has no visible gallstones on imaging.  Await HIDA.  If this is abnormal would proceed with a cholecystectomy with a cholangiogram.  If she does not get a EGD would just empirically cover her with a PPI.  If the HIDA is normal would strongly consider a EGD as she is a chronic NSAID user.    Objective    Alert and appropriate  Nonicteric  Chest symmetric excursion  Abdomen soft in the epigastrium without any rebound or guarding  Extremities full range of motion x 4    Assessment and plan  Epigastric pain and nausea.  May be biliary colic but she has no documented gallstones.  Await HIDA.  If this is normal would strongly consider a EGD as she is a chronic NSAID user.  Even if her HIDA is abnormal with a decreased ejection fraction as long as it shows visualization would consider discharge on a low-fat diet to come back as an outpatient to get her gallbladder out as it is not going to happen over a weekend unless it became a emergent problem which is highly unlikely.  She understands this plan  
Subjective    Still having quite a bit of abdominal discomfort.  No plans to keep her in the hospital until a laparoscopic cholecystectomy with a cholangiogram tomorrow afternoon.  Expect that her gallbladder is the source of her trouble.  Will cover her with proton pump inhibitor for now as well.    Objective    Alert and appropriate  Anicteric  Chest symmetric excursion  Abdomen sore in the epigastrium  Extremities full range of motion x 4    Assessment and plan    Persistent abdominal pain.  Presume secondary to biliary tract problems.  Plan to proceed with a laparoscopically cystectomy with cholangiogram tomorrow afternoon.  Will cover for possible gastritis or peptic ulcer disease proton pump inhibitor and have her avoid nonsteroidals.  
[facility] Service   Medication Review    The patient's list of current home medications has been reviewed.     Source(s) of information: spoke to patient     Based on information provided by the above source(s), I have updated the patient's home med list as described below.       I changed or updated the following medications on the patient's home medication list:  Discontinued None      Added Zinc Acetate, Oral, (ZINC ACETATE PO)  MAGNESIUM PO  Turmeric (QC TUMERIC COMPLEX PO)  APPLE CIDER VINEGAR PO  CINNAMON PO  Misc Natural Products (RELAX & SLEEP PO)     Adjusted   None      Other Notes None            Please feel free to call me with any questions about this encounter. Thank you.    This note will be reviewed and co-signed by the [facility] Pharmacist. The pharmacist will review inpatient orders and contact the physician about any discrepancies.    Andrew De Guzman, pharmacy technician  [facility]  Phone:  [phone]  Fax: [phone]      Electronically signed by Andrew De Guzman on 4/18/2024 at 5:46 PM       Prior to Admission medications    Medication Sig   Zinc Acetate, Oral, (ZINC ACETATE PO) Take 1 tablet by mouth in the morning and at bedtime   MAGNESIUM PO Take 1 tablet by mouth 2 times daily     Turmeric (QC TUMERIC COMPLEX PO) Take 1 tablet by mouth 2 times daily   APPLE CIDER VINEGAR PO Take 1 capsule by mouth 2 times daily   CINNAMON PO Take 1 tablet by mouth daily     Misc Natural Products (RELAX & SLEEP PO) Take 2 tablets by mouth nightly         
[facility]  Office: [phone]  Derrick Richards DO, Pedro Guajardo DO, Pasquale Ribera DO, Tuan Gómez DO, Aarti Bang MD, Bree Gonzalez MD, Bhupendra Up MD, Fabi Gaming MD,  Mulugeta Harden MD, Mary Kate Quijano MD, Joana Mckeon MD,  Chelle Pascual DO, Izabela Roldan MD, Jerry Ramires MD, Dontrell Richards DO, Marilee Ramsay MD,  Lex Shah DO, Kavitha Newell MD, Katja Alvarado MD, Citlaly Dye MD, Leo Ye MD,  Norris Paige MD, Yobani Joaquin MD, Tessy Green MD, Janey Radford MD, Efrain Tuttle MD, Divya Suh MD, Sonny Stewart DO, Js Steen DO, Bart Gay MD,  Lonny Rincon MD, Shirley Waterhouse, CNP,  Geetha Carrillo CNP, Sen Blanco, CNP,  Deneen Aaron, MARGAUX, Edwige Cota, CNP, Emilie Muniz, CNP, Dariela Delcid CNP, Wen Schuster, CNP, Lety Kay, PA-C, Franchesca Lynn PA-C, Marlen Anderson, CNP, Mary Trujillo, CNP, Elisabeth Coburn, CNP, Mechelle Centeno, CNP, Tania Aly CNP, Talia Carrion, CNS, Farhana Mathews, CNP, Alma Hansen CNP, Tracy Schwab, CNP         [facility]   IN-PATIENT SERVICE   [facility]    Progress Note    4/21/2024    12:46 PM    Name:   Mary Gamboa  MRN:     [id number]     Acct:      [id number]   Room:   2005/2005-01  IP Day:  4  Admit Date:  4/17/2024  6:15 PM    PCP:   No primary care provider on file.  Code Status:  Full Code    Subjective:     Patient seen and examined today.  She is still very uncomfortable.  Having nausea and vomiting.  Says her abdominal pain is mainly located in her right upper quadrant goes to her back.      Brief History:     This is a 51-year-old female who presented to the hospital for evaluation right upper quadrant abdominal pain and epigastric pain for 3 days with associated nausea and vomiting.  MRI of her abdomen showed trace pericholecystic fluid without wall thickening or gallstone but was equivocal for underlying cholecystitis along with mild 
Results   Component Value Date/Time    HGB 9.8 04/22/2024 05:39 AM    HCT 30.7 04/22/2024 05:39 AM     CMP:    Lab Results   Component Value Date/Time     04/22/2024 05:39 AM    K 4.1 04/22/2024 05:39 AM     04/22/2024 05:39 AM    CO2 27 04/22/2024 05:39 AM    BUN 9 04/22/2024 05:39 AM    CREATININE 0.6 04/22/2024 05:39 AM    LABGLOM >90 04/22/2024 05:39 AM    GLUCOSE 93 04/22/2024 05:39 AM    PROT 6.1 04/22/2024 05:39 AM    CALCIUM 8.6 04/22/2024 05:39 AM    BILITOT 0.3 04/22/2024 05:39 AM    ALKPHOS 139 04/22/2024 05:39 AM     04/22/2024 05:39 AM     04/22/2024 05:39 AM     BMP:    Lab Results   Component Value Date/Time     04/22/2024 05:39 AM    K 4.1 04/22/2024 05:39 AM     04/22/2024 05:39 AM    CO2 27 04/22/2024 05:39 AM    BUN 9 04/22/2024 05:39 AM    CREATININE 0.6 04/22/2024 05:39 AM    CALCIUM 8.6 04/22/2024 05:39 AM    LABGLOM >90 04/22/2024 05:39 AM    GLUCOSE 93 04/22/2024 05:39 AM     Sodium:    Lab Results   Component Value Date/Time     04/22/2024 05:39 AM     Potassium:    Lab Results   Component Value Date/Time    K 4.1 04/22/2024 05:39 AM     BUN/Creatinine:    Lab Results   Component Value Date/Time    BUN 9 04/22/2024 05:39 AM    CREATININE 0.6 04/22/2024 05:39 AM     Hepatic Function Panel:    Lab Results   Component Value Date/Time    ALKPHOS 139 04/22/2024 05:39 AM     04/22/2024 05:39 AM     04/22/2024 05:39 AM    PROT 6.1 04/22/2024 05:39 AM    BILITOT 0.3 04/22/2024 05:39 AM    BILIDIR 0.1 04/22/2024 05:39 AM    IBILI 0.2 04/22/2024 05:39 AM     PT/INR:    Lab Results   Component Value Date/Time    PROTIME 13.5 04/18/2024 05:30 AM    INR 1.0 04/18/2024 05:30 AM         Assessment:     60 yo female with symptomatic biliary dyskinesia/colic, chronic cholecystitis. LFT's down-trending f/u any remaining serologies.   Going for cholecystectomy this afternoon with IOC, appreciate IOC.     Plan:   F/u IOC results  F/u lft's and any 
  MPV 10.5 10.2 10.3   INR 1.0  --   --      Chemistry:  Recent Labs     04/18/24  0530 04/19/24  0549 04/20/24  0539    137 138   K 4.2 4.3 3.7    105 107   CO2 20 23 25   GLUCOSE 92 119* 139*   BUN 18 22* 10   CREATININE 0.5 0.5 0.4*   ANIONGAP 13 9 6*   LABGLOM >90 >90 >90   CALCIUM 8.5* 8.2* 8.3*     Recent Labs     04/18/24  0530 04/18/24  1106 04/19/24  0210 04/19/24  0549 04/19/24  0624 04/19/24  1613 04/19/24  2039 04/20/24  0206 04/20/24  0539 04/20/24  0621   PROT 6.7  --   --  6.3*  --   --   --   --  6.0*  --    *  --   --  210*  --   --   --   --  177*  --    *  --   --  259*  --   --   --   --  244*  --    ALKPHOS 187*  --   --  180*  --   --   --   --  161*  --    BILITOT 0.4  --   --  0.3  --   --   --   --  0.2*  --    BILIDIR 0.1  --   --  0.1  --   --   --   --  <0.1  --    LIPASE 46  --   --  72*  --   --   --   --  84*  --    POCGLU  --    < > 125*  --  117* 126* 143* 142*  --  148*    < > = values in this interval not displayed.     ABG:No results found for: \"POCPH\", \"PHART\", \"PH\", \"POCPCO2\", \"DRF6MMK\", \"PCO2\", \"POCPO2\", \"PO2ART\", \"PO2\", \"POCHCO3\", \"LBU8QKQ\", \"HCO3\", \"NBEA\", \"PBEA\", \"BEART\", \"BE\", \"THGBART\", \"THB\", \"EPP4QBL\", \"UMUL1JOX\", \"E5TDNVOZ\", \"O2SAT\", \"FIO2\"  No results found for: \"SPECIAL\"  No results found for: \"CULTURE\"    Radiology:  NM HEPATOBILIARY SCAN W EJECTION FRACTION    Result Date: 4/19/2024  No acute cholecystitis. Gallbladder ejection fraction is 23%.     MRI ABDOMEN W WO CONTRAST MRCP    Result Date: 4/18/2024  1. Trace pericholecystic fluid without wall thickening or gallstones identified, equivocal for underlying cholecystitis. 2. Mild enlargement of the extrahepatic biliary tree without evidence for choledocholithiasis.       Physical Examination:     General appearance:  alert, cooperative and no distress  Mental Status:  oriented to person, place and time and normal affect  Lungs:  clear to auscultation bilaterally, normal effort  Heart:

## 2024-04-29 LAB — SURGICAL PATHOLOGY REPORT: NORMAL

## (undated) DEVICE — SUTURE MONOCRYL SZ 3-0 L27IN ABSRB UD PS-2 3/8 CIR REV CUT NDL MCP427H

## (undated) DEVICE — BAG SPEC REM 224ML W4XL6IN DIA10MM 1 HND GYN DISP ENDOPCH

## (undated) DEVICE — TROCARS: Brand: KII® BALLOON BLUNT TIP SYSTEM

## (undated) DEVICE — BLADELESS OBTURATOR: Brand: WECK VISTA

## (undated) DEVICE — SINGLE-USE BIOPSY FORCEPS: Brand: RADIAL JAW 4

## (undated) DEVICE — SPHINCTEROTOME: Brand: DREAMTOME™ RX 44

## (undated) DEVICE — SUTURE MONOCRYL SZ 4-0 L27IN ABSRB UD L19MM PS-2 1/2 CIR PRIM Y426H

## (undated) DEVICE — GOWN,AURORA,NON-REINFORCED,2XL: Brand: MEDLINE

## (undated) DEVICE — SYRINGE MED 30ML STD CLR PLAS LUERLOCK TIP N CTRL DISP

## (undated) DEVICE — RETRIEVAL BALLOON CATHETER: Brand: EXTRACTOR™ PRO RX

## (undated) DEVICE — BITEBLOCK ENDOSCP 60FR MAXI WHT POLYETH STURDY W/ VELC WVN

## (undated) DEVICE — STRAP,POSITIONING,KNEE/BODY,FOAM,4X60": Brand: MEDLINE

## (undated) DEVICE — SET EXTN IV L30IN TBNG DIA0.1IN PRIMING 4ML MACBOR FEM ADPT

## (undated) DEVICE — TIP COVER ACCESSORY

## (undated) DEVICE — 4-PORT MANIFOLD: Brand: NEPTUNE 2

## (undated) DEVICE — ARM DRAPE

## (undated) DEVICE — SOLUTION IRRIG 1000ML 0.9% SOD CHL USP POUR PLAS BTL

## (undated) DEVICE — 3M™ IOBAN™ 2 ANTIMICROBIAL INCISE DRAPE 6650EZ: Brand: IOBAN™ 2

## (undated) DEVICE — BLADE,CARBON-STEEL,15,STRL,DISPOSABLE,TB: Brand: MEDLINE

## (undated) DEVICE — GLOVE SURG SZ 8 CRM LTX FREE POLYISOPRENE POLYMER BEAD ANTI

## (undated) DEVICE — CONTAINER,SPECIMEN,OR STERILE,4OZ: Brand: MEDLINE

## (undated) DEVICE — GLOVE SURG SZ 85 L12IN FNGR THK79MIL GRN LTX FREE

## (undated) DEVICE — CAP BX OLYMPUS AUTOCAP RX

## (undated) DEVICE — SEAL

## (undated) DEVICE — STAZ ROBOT: Brand: MEDLINE INDUSTRIES, INC.

## (undated) DEVICE — STAZ ENDO KIT: Brand: MEDLINE INDUSTRIES, INC.

## (undated) DEVICE — ELECTRODE PT RET AD L9FT HI MOIST COND ADH HYDRGEL CORDED

## (undated) DEVICE — Z DISCONTINUED USE 2220241 SUTURE SZ 0 27IN 5/8 CIR UR-6  TAPER PT VIOLET ABSRB VICRYL J603H